# Patient Record
Sex: FEMALE | Race: WHITE | HISPANIC OR LATINO | Employment: FULL TIME | URBAN - METROPOLITAN AREA
[De-identification: names, ages, dates, MRNs, and addresses within clinical notes are randomized per-mention and may not be internally consistent; named-entity substitution may affect disease eponyms.]

---

## 2019-01-03 ENCOUNTER — OFFICE VISIT (OUTPATIENT)
Dept: OBGYN CLINIC | Facility: CLINIC | Age: 39
End: 2019-01-03
Payer: COMMERCIAL

## 2019-01-03 VITALS
DIASTOLIC BLOOD PRESSURE: 60 MMHG | SYSTOLIC BLOOD PRESSURE: 116 MMHG | HEIGHT: 64 IN | WEIGHT: 149.6 LBS | BODY MASS INDEX: 25.54 KG/M2

## 2019-01-03 DIAGNOSIS — Z87.42 HISTORY OF OVARIAN CYST: ICD-10-CM

## 2019-01-03 DIAGNOSIS — Z33.1 ENCOUNTER FOR INCIDENTAL PREGNANCY: ICD-10-CM

## 2019-01-03 DIAGNOSIS — Z86.2 HISTORY OF ANEMIA: ICD-10-CM

## 2019-01-03 DIAGNOSIS — N91.2 AMENORRHEA: ICD-10-CM

## 2019-01-03 DIAGNOSIS — Z98.891 HISTORY OF 2 CESAREAN SECTIONS: Primary | ICD-10-CM

## 2019-01-03 DIAGNOSIS — O09.521 AMA (ADVANCED MATERNAL AGE) MULTIGRAVIDA 35+, FIRST TRIMESTER: ICD-10-CM

## 2019-01-03 LAB — SL AMB POCT URINE HCG: POSITIVE

## 2019-01-03 PROCEDURE — 81025 URINE PREGNANCY TEST: CPT | Performed by: OBSTETRICS & GYNECOLOGY

## 2019-01-03 PROCEDURE — 99203 OFFICE O/P NEW LOW 30 MIN: CPT | Performed by: OBSTETRICS & GYNECOLOGY

## 2019-01-03 RX ORDER — PNV NO.95/FERROUS FUM/FOLIC AC 28MG-0.8MG
1 TABLET ORAL DAILY
Qty: 30 TABLET | Refills: 9 | Status: SHIPPED | OUTPATIENT
Start: 2019-01-03 | End: 2019-10-11 | Stop reason: SDUPTHER

## 2019-01-07 ENCOUNTER — APPOINTMENT (OUTPATIENT)
Dept: LAB | Facility: CLINIC | Age: 39
End: 2019-01-07
Payer: COMMERCIAL

## 2019-01-07 DIAGNOSIS — Z33.1 ENCOUNTER FOR INCIDENTAL PREGNANCY: ICD-10-CM

## 2019-01-07 LAB
ABO GROUP BLD: NORMAL
B-HCG SERPL-ACNC: ABNORMAL MIU/ML
BASOPHILS # BLD AUTO: 0.02 THOUSANDS/ΜL (ref 0–0.1)
BASOPHILS NFR BLD AUTO: 0 % (ref 0–1)
BLD GP AB SCN SERPL QL: NEGATIVE
EOSINOPHIL # BLD AUTO: 0.05 THOUSAND/ΜL (ref 0–0.61)
EOSINOPHIL NFR BLD AUTO: 1 % (ref 0–6)
ERYTHROCYTE [DISTWIDTH] IN BLOOD BY AUTOMATED COUNT: 15.1 % (ref 11.6–15.1)
EXTERNAL HIV-1 ANTIBODY: NEGATIVE
HCT VFR BLD AUTO: 36.3 % (ref 34.8–46.1)
HGB BLD-MCNC: 11.3 G/DL (ref 11.5–15.4)
IMM GRANULOCYTES # BLD AUTO: 0.01 THOUSAND/UL (ref 0–0.2)
IMM GRANULOCYTES NFR BLD AUTO: 0 % (ref 0–2)
LYMPHOCYTES # BLD AUTO: 1.41 THOUSANDS/ΜL (ref 0.6–4.47)
LYMPHOCYTES NFR BLD AUTO: 29 % (ref 14–44)
MCH RBC QN AUTO: 27.2 PG (ref 26.8–34.3)
MCHC RBC AUTO-ENTMCNC: 31.1 G/DL (ref 31.4–37.4)
MCV RBC AUTO: 87 FL (ref 82–98)
MONOCYTES # BLD AUTO: 0.54 THOUSAND/ΜL (ref 0.17–1.22)
MONOCYTES NFR BLD AUTO: 11 % (ref 4–12)
NEUTROPHILS # BLD AUTO: 2.8 THOUSANDS/ΜL (ref 1.85–7.62)
NEUTS SEG NFR BLD AUTO: 59 % (ref 43–75)
NRBC BLD AUTO-RTO: 0 /100 WBCS
PLATELET # BLD AUTO: 345 THOUSANDS/UL (ref 149–390)
PMV BLD AUTO: 9.9 FL (ref 8.9–12.7)
PROGEST SERPL-MCNC: 15.9 NG/ML
RBC # BLD AUTO: 4.16 MILLION/UL (ref 3.81–5.12)
RH BLD: POSITIVE
SPECIMEN EXPIRATION DATE: NORMAL
TSH SERPL DL<=0.05 MIU/L-ACNC: 2.39 UIU/ML (ref 0.36–3.74)
WBC # BLD AUTO: 4.83 THOUSAND/UL (ref 4.31–10.16)

## 2019-01-07 PROCEDURE — 84443 ASSAY THYROID STIM HORMONE: CPT

## 2019-01-07 PROCEDURE — 84702 CHORIONIC GONADOTROPIN TEST: CPT

## 2019-01-07 PROCEDURE — 84144 ASSAY OF PROGESTERONE: CPT

## 2019-01-07 PROCEDURE — 36415 COLL VENOUS BLD VENIPUNCTURE: CPT

## 2019-01-07 PROCEDURE — 80081 OBSTETRIC PANEL INC HIV TSTG: CPT

## 2019-01-08 LAB
HBV SURFACE AG SER QL: NORMAL
HIV 1+2 AB+HIV1 P24 AG SERPL QL IA: NORMAL
RPR SER QL: NORMAL
RUBV IGG SERPL IA-ACNC: 73.2 IU/ML

## 2019-01-29 ENCOUNTER — INITIAL PRENATAL (OUTPATIENT)
Dept: OBGYN CLINIC | Facility: CLINIC | Age: 39
End: 2019-01-29
Payer: COMMERCIAL

## 2019-01-29 ENCOUNTER — ULTRASOUND (OUTPATIENT)
Dept: OBGYN CLINIC | Facility: CLINIC | Age: 39
End: 2019-01-29
Payer: COMMERCIAL

## 2019-01-29 DIAGNOSIS — Z36.9 ANTENATAL SCREENING ENCOUNTER: ICD-10-CM

## 2019-01-29 DIAGNOSIS — Z34.81 PRENATAL CARE, SUBSEQUENT PREGNANCY, FIRST TRIMESTER: Primary | ICD-10-CM

## 2019-01-29 PROCEDURE — 76817 TRANSVAGINAL US OBSTETRIC: CPT | Performed by: OBSTETRICS & GYNECOLOGY

## 2019-01-29 PROCEDURE — OBC

## 2019-01-29 NOTE — PROGRESS NOTES
Prenatal ultrasound was performed  Patient was given [facility] packet  She is declining genetic counseling, however is requesting 1st trimester screening  MFM did not have an appointment available in her time window, so they will have their  call patient directly to set up

## 2019-01-29 NOTE — PROGRESS NOTES
OB Intake  o Patient presents for OB intake interview  o Accompanied by: Self  o FOB:    Involved:Unknown      Planned pregnancy: Unknown  o     o Hx of  delivery prior to 36 weeks 6 days:                             no  o LMP:   Patient's last menstrual period was 2018 (exact date)  o U/S date: 19   - 10 weeks  5 days  o Estimated Date of Delivery: None noted  - confirmed by LMP or U/S    o Signs and Symptoms of pregnancy:               positive home pregnancy test  - Constipation:    no  - Headaches:   no  - Cramping/spotting:    no  - PICA cravings:    no  - Diabetes: If you answer yes, please order 1 hr gtt testing, 50grams   History of gestational diabetes    no   BMI >35     no   Advance maternal age >35   yes   First degree relative with type 2 diabetes    no   History of PCOS   no   Current metformin use    no   Prior history of macrosomia or LGA    no  o Immunization Record  -   - There is no immunization history on file for this patient   o Tdap:  - Counseled to be given after 28 weeks  o Influenza vaccine discussed  o MRSA questionnaire:     negative  o Dental visit within last 6 months  - yes   - If no, recommendations discussed  Interview education:  Educational material provided on After Visit Summary (AVS)     Handouts given at todays visit  o [employer] phone application guide  o [employer] support center  o CDCs Response to [place]  o Pregnancy Warning Signs  o Medications Safe to Take During Pregnancy  o Hospital Visiting Guidelines  o Lab Locations  o [facility]  o Pediatric Practice Locations  o Birth Plan Form  o Labor Room rules and Regulations  o Birth Certificate Worksheets  o Authorization for Release for Photographers  o Perineal Massage  o [facility]  - Sequential screening reviewed  - Cystic fibrosis test reviewed  - Spinal Muscular Atrophy test reviewed    N[facility] Family [facility]:   No  ONAF form submitted:    No    MyChart activated (not 1518 years of age)  If no, explain: Information Given  - Code provided:   No    Interview done by: Jovani Nails RN 01/29/19    1  Prenatal care, subsequent pregnancy, first trimester  Patient reported a large painful varicose vein on left leg with significant edema with last pregnancy  Pt reports she was put on baby aspirin as a preventative measure   Cecilio high blood pressures at the time

## 2019-02-15 ENCOUNTER — ROUTINE PRENATAL (OUTPATIENT)
Dept: PERINATAL CARE | Facility: CLINIC | Age: 39
End: 2019-02-15
Payer: COMMERCIAL

## 2019-02-15 VITALS
BODY MASS INDEX: 26.29 KG/M2 | SYSTOLIC BLOOD PRESSURE: 106 MMHG | HEIGHT: 64 IN | WEIGHT: 154 LBS | HEART RATE: 82 BPM | DIASTOLIC BLOOD PRESSURE: 67 MMHG

## 2019-02-15 DIAGNOSIS — Z36.9 ANTENATAL SCREENING ENCOUNTER: ICD-10-CM

## 2019-02-15 DIAGNOSIS — O09.522 ELDERLY MULTIGRAVIDA, SECOND TRIMESTER: Primary | ICD-10-CM

## 2019-02-15 DIAGNOSIS — Z3A.12 12 WEEKS GESTATION OF PREGNANCY: ICD-10-CM

## 2019-02-15 PROCEDURE — 76801 OB US < 14 WKS SINGLE FETUS: CPT | Performed by: OBSTETRICS & GYNECOLOGY

## 2019-02-15 PROCEDURE — 99241 PR OFFICE CONSULTATION NEW/ESTAB PATIENT 15 MIN: CPT | Performed by: OBSTETRICS & GYNECOLOGY

## 2019-02-15 PROCEDURE — 76813 OB US NUCHAL MEAS 1 GEST: CPT | Performed by: OBSTETRICS & GYNECOLOGY

## 2019-02-15 NOTE — PROGRESS NOTES
Please refer to the [facility] ultrasound report in Ob Procedures for additional information regarding the visit to the [facility], [facility]  today

## 2019-02-15 NOTE — LETTER
February 15, 2019     Aayush Astorga MD  [facility]    Patient: Rehan Stark   YOB: 1980   Date of Visit: 2/15/2019       Dear Dr Ivette Ramirez: Thank you for referring Rehan Stark to me for evaluation  Below are my notes for this consultation  If you have questions, please do not hesitate to call me  I look forward to following your patient along with you  Sincerely,        Mic Schulz MD        CC: No Recipients  Mic Schulz MD  2/15/2019  4:34 PM  Sign at close encounter  Please refer to the [facility] ultrasound report in Ob Procedures for additional information regarding the visit to the [facility] the [facility]  today

## 2019-02-27 ENCOUNTER — INITIAL PRENATAL (OUTPATIENT)
Dept: OBGYN CLINIC | Facility: CLINIC | Age: 39
End: 2019-02-27

## 2019-02-27 ENCOUNTER — TELEPHONE (OUTPATIENT)
Dept: PERINATAL CARE | Facility: CLINIC | Age: 39
End: 2019-02-27

## 2019-02-27 VITALS
BODY MASS INDEX: 26.6 KG/M2 | HEIGHT: 64 IN | HEART RATE: 83 BPM | DIASTOLIC BLOOD PRESSURE: 68 MMHG | WEIGHT: 155.8 LBS | SYSTOLIC BLOOD PRESSURE: 112 MMHG

## 2019-02-27 DIAGNOSIS — O09.522 ADVANCED MATERNAL AGE IN MULTIGRAVIDA, SECOND TRIMESTER: ICD-10-CM

## 2019-02-27 DIAGNOSIS — Z11.51 SCREENING FOR HPV (HUMAN PAPILLOMAVIRUS): ICD-10-CM

## 2019-02-27 DIAGNOSIS — Z98.891 HISTORY OF 2 CESAREAN SECTIONS: ICD-10-CM

## 2019-02-27 DIAGNOSIS — Z11.3 SCREENING FOR STDS (SEXUALLY TRANSMITTED DISEASES): Primary | ICD-10-CM

## 2019-02-27 DIAGNOSIS — Z98.890 HISTORY OF AUGMENTATION OF BOTH BREASTS: ICD-10-CM

## 2019-02-27 DIAGNOSIS — O22.02 OBSTETRIC VARICOSE VEINS IN SECOND TRIMESTER: ICD-10-CM

## 2019-02-27 DIAGNOSIS — Z12.4 SCREENING FOR CERVICAL CANCER: ICD-10-CM

## 2019-02-27 DIAGNOSIS — Z3A.14 14 WEEKS GESTATION OF PREGNANCY: ICD-10-CM

## 2019-02-27 PROCEDURE — G0145 SCR C/V CYTO,THINLAYER,RESCR: HCPCS | Performed by: OBSTETRICS & GYNECOLOGY

## 2019-02-27 PROCEDURE — 87591 N.GONORRHOEAE DNA AMP PROB: CPT | Performed by: OBSTETRICS & GYNECOLOGY

## 2019-02-27 PROCEDURE — 87624 HPV HI-RISK TYP POOLED RSLT: CPT | Performed by: OBSTETRICS & GYNECOLOGY

## 2019-02-27 PROCEDURE — 87491 CHLMYD TRACH DNA AMP PROBE: CPT | Performed by: OBSTETRICS & GYNECOLOGY

## 2019-02-27 PROCEDURE — PNV: Performed by: OBSTETRICS & GYNECOLOGY

## 2019-02-27 NOTE — PROGRESS NOTES
IUP at 14 weeks and 6 days, history of  section x2  Patient recently completed the 1st part sequential screen testing 2nd part is pending  Patient complains of all varicose veins in her left lower leg  Recommend support hose while at work and elevate her legs at the end of the day  Patient request note to decrease work are some 10 hours a day to 8 hours only  All questions answered  Reviewed signs of  labor and kick counts follow-up in 4 weeks and p r n

## 2019-02-27 NOTE — LETTER
Name: Abhijeet Bethea  : 1980  MRN: [id number]    To:   Centralized Faxing Team [phone]      The attached documents are complete  Please scan and add into the patient's chart  No other action is needed at this time  Please call us with any questions at [phone]      Rozina Ahn RN

## 2019-02-27 NOTE — LETTER
February 27, 2019     Patient: Miesha Hoang   YOB: 1980   Date of Visit: 2/27/2019       To Whom it May Concern:    Miesha Hoang is under my professional care  She was seen in my office on 2/27/2019  Please allow Edison to work only 8 hour days    If you have any questions or concerns, please don't hesitate to call  Sincerely,          JOSHUA Urias DO        CC: No Recipients

## 2019-02-27 NOTE — LETTER
02/27/19  Aung Calix  1980    Thank you for completing the cell-free DNA screen ("non-invasive prenatal screening" or "[id number]")  To obtain comprehensive screening results, please complete blood work for MSAFP (maternal-serum alpha fetoprotein) between the weeks of ______ to ______  Based on your insurance coverage, please use one of the following locations  Call our office for any questions at [phone]      [facility] [address]   [facility], [facility], [address] [facility]   Phone: [address]  [address] [facility], [address] [place]    Phone: [address], [address] [place] [address]  Phone: [phone] [address] [facility], [address]   Phone: [phone] Tiffany Bowen for lab)    [address] [facility]  [facility] [address], [address] [facility]; [place], [address] [place] [address]   Phone: [phone]    [address] [facility]  [address] [facility], [address], [place] [address]   Phone: [phone]    Sincerely,    Venice Bryan RN

## 2019-02-27 NOTE — TELEPHONE ENCOUNTER
Edison notified [id number] results are WNL along with gender of baby at her request  Instructions given and TRF mailed for part 2 blood draw

## 2019-02-28 LAB
HPV HR 12 DNA CVX QL NAA+PROBE: NEGATIVE
HPV16 DNA CVX QL NAA+PROBE: NEGATIVE
HPV18 DNA CVX QL NAA+PROBE: NEGATIVE

## 2019-03-01 LAB
C TRACH DNA SPEC QL NAA+PROBE: NEGATIVE
N GONORRHOEA DNA SPEC QL NAA+PROBE: NEGATIVE

## 2019-03-03 LAB
LAB AP GYN PRIMARY INTERPRETATION: NORMAL
Lab: NORMAL

## 2019-03-05 ENCOUNTER — ROUTINE PRENATAL (OUTPATIENT)
Dept: OBGYN CLINIC | Facility: CLINIC | Age: 39
End: 2019-03-05
Payer: COMMERCIAL

## 2019-03-05 VITALS
WEIGHT: 156 LBS | DIASTOLIC BLOOD PRESSURE: 69 MMHG | HEIGHT: 64 IN | HEART RATE: 87 BPM | SYSTOLIC BLOOD PRESSURE: 112 MMHG | BODY MASS INDEX: 26.63 KG/M2

## 2019-03-05 DIAGNOSIS — Z3A.15 15 WEEKS GESTATION OF PREGNANCY: Primary | ICD-10-CM

## 2019-03-05 DIAGNOSIS — O26.899 PELVIC PAIN IN PREGNANCY: ICD-10-CM

## 2019-03-05 DIAGNOSIS — R10.2 PELVIC PAIN IN PREGNANCY: ICD-10-CM

## 2019-03-05 PROCEDURE — 99214 OFFICE O/P EST MOD 30 MIN: CPT | Performed by: OBSTETRICS & GYNECOLOGY

## 2019-03-05 NOTE — PROGRESS NOTES
Patient is here for problem visit  Patient is complaining of low back pain and right lower quadrant pain that started yesterday  She states the pain is about a 7/10  She denies any bleeding or leakage of fluid or vaginal discharge  She did start to notice the pain worse with movement for example getting up out of bed  Patient denies a history of kidney stones  She has no CVA tenderness  There is no distinct abdominal tenderness or rebound or guarding  Cervical length was 32 mm with no funneling  Discussed with patient most likely round ligament pain  She was advised to rest, take Tylenol as needed for pain and to use mild heat as needed  She was given a note for work for today and tomorrow and she may return back to work on Thursday

## 2019-03-05 NOTE — LETTER
March 5, 2019     Patient: Deo Summers   YOB: 1980   Date of Visit: 3/5/2019       To Whom it May Concern:    Deo Summers is under my professional care  She was seen in my office on 3/5/2019  She may return to work on 3/7/19  Please excuse the patient from work from March 5, 2019 to March 6, 2019  If you have any questions or concerns, please don't hesitate to call           Sincerely,          Uzma Quijano MD        CC: No Recipients

## 2019-03-05 NOTE — PATIENT INSTRUCTIONS
Round Ligament Pain   AMBULATORY CARE:   Round ligament pain  is caused when ligaments are stretched as your uterus (womb) gets bigger during pregnancy  Round ligaments are found on each side of your uterus  They are bands of tissue that hold the uterus in place  Round ligament pain happens most often during the second trimester  It is a normal part of pregnancy and should stop by the third trimester  The pain is not serious and will not hurt your baby  Common signs and symptoms of round ligament pain:   · Pain on one or both sides of your lower abdomen or groin that may move up to your hip    · Spasms in the muscles in your abdomen    · Pain that lasts a few seconds    · Pain that happens when you exercise, sneeze, change positions, or stand quickly  Contact your obstetrician if:   · You have pain that is spreading to other parts of your body  · You have new or worsening pain  · You have pain that lasts longer than a few minutes at a time  · You have questions or concerns about your condition or care  Manage your pain:  Round ligament pain does not need to be treated  The following may help make you more comfortable:  · Rest as often as you can  Rest can help relieve round ligament pain  You might want to lie on the side that has pain  Place a pillow under your abdomen  Keep another pillow between your knees  · Move slowly  Sudden movement can stretch the ligaments and cause pain  Stand, sit, and change positions slowly  Try to tighten the muscles in your hips before you sneeze or laugh  You can also sit down and bring your knees up toward your abdomen  This can help relieve tension on the ligaments  · Exercise as directed  Gentle exercise can keep the ligaments loose and strengthen core (abdominal) muscles  An example is swimming, or a yoga program designed for pregnancy  Ask your healthcare provider which exercises are safe for you and how often to exercise   For most healthy women, a good goal is to try to get at least 30 minutes of exercise every day  If activity causes pain, try not to walk too long or too far at one time  Break your exercise up into short amounts  · Apply a warm compress to the area  Warmth can relieve pain and muscle spasms  Ask your healthcare provider if you can take a warm bath or use a heating pad  Keep all heat settings low  High heat can be dangerous for your baby  Do not sit in a hot tub or use hot water in your bath  You may also be able to massage the area gently while you are applying heat  Massage can help relieve pain  · Ask about pain medicines  Ask your healthcare provider before you take any medicine during pregnancy, including over-the-counter pain medicines  Your healthcare provider may recommend acetaminophen to relieve the pain  Ask how much to take and how often to take it  Follow directions  Acetaminophen can cause liver damage  Too much medicine can be harmful to your baby  Follow up with your obstetrician as directed:  Write down your questions so you remember to ask them during your visits  © 2017 [address]  Information is for End User's use only and may not be sold, redistributed or otherwise used for commercial purposes  All illustrations and images included in CareNotes® are the copyrighted property of A D A M , Inc  or Harsh Gusman  The above information is an  only  It is not intended as medical advice for individual conditions or treatments  Talk to your doctor, nurse or pharmacist before following any medical regimen to see if it is safe and effective for you

## 2019-03-27 ENCOUNTER — TRANSCRIBE ORDERS (OUTPATIENT)
Dept: ADMINISTRATIVE | Facility: HOSPITAL | Age: 39
End: 2019-03-27

## 2019-03-27 ENCOUNTER — ROUTINE PRENATAL (OUTPATIENT)
Dept: OBGYN CLINIC | Facility: CLINIC | Age: 39
End: 2019-03-27

## 2019-03-27 ENCOUNTER — LAB (OUTPATIENT)
Dept: LAB | Facility: HOSPITAL | Age: 39
End: 2019-03-27
Attending: OBSTETRICS & GYNECOLOGY
Payer: COMMERCIAL

## 2019-03-27 VITALS
BODY MASS INDEX: 27.49 KG/M2 | HEIGHT: 64 IN | HEART RATE: 92 BPM | SYSTOLIC BLOOD PRESSURE: 112 MMHG | WEIGHT: 161 LBS | DIASTOLIC BLOOD PRESSURE: 70 MMHG

## 2019-03-27 DIAGNOSIS — Z36.9 RESEARCH REQUESTED ANTENATAL ULTRASOUND SCAN: ICD-10-CM

## 2019-03-27 DIAGNOSIS — O22.00 VARICOSE VEINS DURING PREGNANCY, ANTEPARTUM: ICD-10-CM

## 2019-03-27 DIAGNOSIS — O09.522 ELDERLY MULTIGRAVIDA, SECOND TRIMESTER: Primary | ICD-10-CM

## 2019-03-27 DIAGNOSIS — Z3A.18 18 WEEKS GESTATION OF PREGNANCY: ICD-10-CM

## 2019-03-27 DIAGNOSIS — Z33.1 PREGNANT STATE, INCIDENTAL: ICD-10-CM

## 2019-03-27 DIAGNOSIS — Z36.9 RESEARCH REQUESTED ANTENATAL ULTRASOUND SCAN: Primary | ICD-10-CM

## 2019-03-27 PROCEDURE — PNV: Performed by: OBSTETRICS & GYNECOLOGY

## 2019-03-27 PROCEDURE — 36415 COLL VENOUS BLD VENIPUNCTURE: CPT

## 2019-03-27 PROCEDURE — 82105 ALPHA-FETOPROTEIN SERUM: CPT

## 2019-03-27 NOTE — PROGRESS NOTES
Patient is here for of routine OB visit  Patient states that she has been feeling movement but not consistently recently  Fetal heart rate was noted to be 144 beats per minute  Limited trans abdominal ultrasound was performed and showed fetal movement  Anterior placenta  Fetus was in transverse presentation  Amniotic fluid appeared grossly normal   Discussed with patient quickening and fetal movement  Patient had a cell free DNA testing that was normal   She is scheduled for a level 2 ultrasound on April 10th  Patient is complaining of varicose veins on the left  Compression stockings were ordered  MSAFP to done   Labs ordered
N/A

## 2019-03-27 NOTE — PATIENT INSTRUCTIONS
Pregnancy at 15 to 18 Weeks   [address]:   What changes are happening in my body? Now that you are in your second trimester, you have more energy  You may also feel hungrier than usual  You may start to experience other symptoms, such as heartburn or dizziness  You may be gaining about ½ to 1 pound a week, and your pregnancy is beginning to show  You may need to start wearing maternity clothes  How do I care for myself at this stage of my pregnancy? · Manage heartburn  by eating 4 or 5 small meals each day instead of large meals  Avoid spicy foods  Avoid eating right before bedtime  · Manage nausea and vomiting  Avoid fatty and spicy foods  Eat small meals throughout the day instead of large meals  Tisha may help to decrease nausea  Ask your healthcare provider about other ways of decreasing nausea and vomiting  · Eat a variety of healthy foods  Healthy foods include fruits, vegetables, whole-grain breads, low-fat dairy foods, beans, lean meats, and fish  Drink liquids as directed  Ask how much liquid to drink each day and which liquids are best for you  Limit caffeine to less than 200 milligrams each day  Limit your intake of fish to 2 servings each week  Choose fish low in mercury such as canned light tuna, shrimp, salmon, cod, or tilapia  Do not  eat fish high in mercury such as swordfish, tilefish, luisito mackerel, and shark  · Take prenatal vitamins as directed  Your need for certain vitamins and minerals, such as folic acid, increases during pregnancy  Prenatal vitamins provide some of the extra vitamins and minerals you need  Prenatal vitamins may also help to decrease the risk of certain birth defects  · Do not smoke  If you smoke, it is never too late to quit  Smoking increases your risk of a miscarriage and other health problems during your pregnancy  Smoking can cause your baby to be born too early or weigh less at birth   Ask your healthcare provider for information if you need help quitting  · Do not drink alcohol  Alcohol passes from your body to your baby through the placenta  It can affect your baby's brain development and cause fetal alcohol syndrome (FAS)  FAS is a group of conditions that causes mental, behavior, and growth problems  · Talk to your healthcare provider before you take any medicines  Many medicines may harm your baby if you take them when you are pregnant  Do not take any medicines, vitamins, herbs, or supplements without first talking to your healthcare provider  Never use illegal or street drugs (such as marijuana or cocaine) while you are pregnant  What are some safety tips during pregnancy? · Avoid hot tubs and saunas  Do not use a hot tub or sauna while you are pregnant, especially during your first trimester  Hot tubs and saunas may raise your baby's temperature and increase the risk of birth defects  · Avoid toxoplasmosis  This is an infection caused by eating raw meat or being around infected cat feces  It can cause birth defects, miscarriages, and other problems  Wash your hands after you touch raw meat  Make sure any meat is well-cooked before you eat it  Avoid raw eggs and unpasteurized milk  Use gloves or ask someone else to clean your cat's litter box while you are pregnant  What changes are happening with my baby? By 18 weeks, your baby may be about 6 inches long from the top of the head to the rump (baby's bottom)  Your baby may weigh about 11 ounces  You may be able to feel your baby's movement at about 18 weeks or later  The first movements may not be that noticeable  They may feel like a fluttering sensation  Your baby also makes sucking movements and can hear certain sounds  What do I need to know about prenatal care? During the first 28 weeks of your pregnancy, you will see your healthcare provider once a month  Your healthcare provider will check your blood pressure and weight   You may also need any of the following:  · A urine test  may also be done to check for sugar and protein  These can be signs of gestational diabetes or infection  · A blood test  may be done to check for anemia (low iron level)  · Fundal height check  is a measurement of your uterus to check your baby's growth  This number is usually the same as the number of weeks that you have been pregnant  · An ultrasound  may be done to check your baby's development  Your healthcare provider may be able to tell you what your baby's gender is during the ultrasound  · Your baby's heart rate  will be checked  When should I seek immediate care? · You have pain or cramping in your abdomen or low back  · You have heavy vaginal bleeding or clotting  · You pass material that looks like tissue or large clots  Collect the material and bring it with you  When should I contact my healthcare provider? · You cannot keep food or drinks down, and you are losing weight  · You have light bleeding  · You have chills or a fever  · You have vaginal itching, burning, or pain  · You have yellow, green, white, or foul-smelling vaginal discharge  · You have pain or burning when you urinate, less urine than usual, or pink or bloody urine  · You have questions or concerns about your condition or care  CARE AGREEMENT:   You have the right to help plan your care  Learn about your health condition and how it may be treated  Discuss treatment options with your caregivers to decide what care you want to receive  You always have the right to refuse treatment  The above information is an  only  It is not intended as medical advice for individual conditions or treatments  Talk to your doctor, nurse or pharmacist before following any medical regimen to see if it is safe and effective for you    © 2017 [employer] Information is for End User's use only and may not be sold, redistributed or otherwise used for commercial purposes  All illustrations and images included in CareNotes® are the copyrighted property of A D A M , Inc  or Harsh Gusman

## 2019-03-30 LAB
2ND TRIMESTER 4 SCREEN SERPL-IMP: NORMAL
AFP ADJ MOM SERPL: 0.93
AFP INTERP AMN-IMP: NORMAL
AFP INTERP SERPL-IMP: NORMAL
AFP INTERP SERPL-IMP: NORMAL
AFP SERPL-MCNC: 41 NG/ML
AGE AT DELIVERY: 38.8 YR
GA METHOD: NORMAL
GA: 18.4 WEEKS
IDDM PATIENT QL: NO
MULTIPLE PREGNANCY: NO
NEURAL TUBE DEFECT RISK FETUS: NORMAL %

## 2019-04-02 ENCOUNTER — TELEPHONE (OUTPATIENT)
Dept: PERINATAL CARE | Facility: CLINIC | Age: 39
End: 2019-04-02

## 2019-04-10 ENCOUNTER — ROUTINE PRENATAL (OUTPATIENT)
Dept: PERINATAL CARE | Facility: CLINIC | Age: 39
End: 2019-04-10
Payer: COMMERCIAL

## 2019-04-10 VITALS
HEIGHT: 64 IN | WEIGHT: 168 LBS | SYSTOLIC BLOOD PRESSURE: 121 MMHG | DIASTOLIC BLOOD PRESSURE: 76 MMHG | BODY MASS INDEX: 28.68 KG/M2 | HEART RATE: 82 BPM

## 2019-04-10 DIAGNOSIS — O22.02: ICD-10-CM

## 2019-04-10 DIAGNOSIS — O09.522 ELDERLY MULTIGRAVIDA, SECOND TRIMESTER: Primary | ICD-10-CM

## 2019-04-10 DIAGNOSIS — Z36.86 ENCOUNTER FOR ANTENATAL SCREENING FOR CERVICAL LENGTH: ICD-10-CM

## 2019-04-10 DIAGNOSIS — Z3A.20 20 WEEKS GESTATION OF PREGNANCY: ICD-10-CM

## 2019-04-10 DIAGNOSIS — O34.219 PREVIOUS CESAREAN SECTION COMPLICATING PREGNANCY, ANTEPARTUM CONDITION OR COMPLICATION: ICD-10-CM

## 2019-04-10 PROCEDURE — 76817 TRANSVAGINAL US OBSTETRIC: CPT | Performed by: OBSTETRICS & GYNECOLOGY

## 2019-04-10 PROCEDURE — 99213 OFFICE O/P EST LOW 20 MIN: CPT | Performed by: OBSTETRICS & GYNECOLOGY

## 2019-04-10 PROCEDURE — 76811 OB US DETAILED SNGL FETUS: CPT | Performed by: OBSTETRICS & GYNECOLOGY

## 2019-04-10 RX ORDER — SENNA PLUS 8.6 MG/1
8.6 TABLET ORAL 2 TIMES DAILY PRN
COMMUNITY
Start: 2018-04-04 | End: 2019-07-15

## 2019-04-15 ENCOUNTER — TELEPHONE (OUTPATIENT)
Dept: OBGYN CLINIC | Facility: CLINIC | Age: 39
End: 2019-04-15

## 2019-04-15 DIAGNOSIS — I83.93 VARICOSE VEINS OF BOTH LOWER EXTREMITIES, UNSPECIFIED WHETHER COMPLICATED: Primary | ICD-10-CM

## 2019-04-17 ENCOUNTER — HOSPITAL ENCOUNTER (OUTPATIENT)
Dept: NON INVASIVE DIAGNOSTICS | Facility: HOSPITAL | Age: 39
Discharge: HOME | End: 2019-04-17
Payer: COMMERCIAL

## 2019-04-17 DIAGNOSIS — Z3A.20 20 WEEKS GESTATION OF PREGNANCY: ICD-10-CM

## 2019-04-17 DIAGNOSIS — O22.02: ICD-10-CM

## 2019-04-17 PROCEDURE — 93971 EXTREMITY STUDY: CPT

## 2019-04-17 PROCEDURE — 93971 EXTREMITY STUDY: CPT | Performed by: SURGERY

## 2019-04-24 ENCOUNTER — ROUTINE PRENATAL (OUTPATIENT)
Dept: OBGYN CLINIC | Facility: CLINIC | Age: 39
End: 2019-04-24

## 2019-04-24 VITALS
BODY MASS INDEX: 28.7 KG/M2 | DIASTOLIC BLOOD PRESSURE: 72 MMHG | SYSTOLIC BLOOD PRESSURE: 112 MMHG | HEART RATE: 83 BPM | WEIGHT: 167.2 LBS

## 2019-04-24 DIAGNOSIS — K30 ACID INDIGESTION: Primary | ICD-10-CM

## 2019-04-24 DIAGNOSIS — O22.02 OBSTETRIC VARICOSE VEINS IN SECOND TRIMESTER: ICD-10-CM

## 2019-04-24 DIAGNOSIS — Z98.891 HISTORY OF CESAREAN SECTION: ICD-10-CM

## 2019-04-24 DIAGNOSIS — Z3A.22 22 WEEKS GESTATION OF PREGNANCY: ICD-10-CM

## 2019-04-24 PROCEDURE — PNV: Performed by: OBSTETRICS & GYNECOLOGY

## 2019-04-24 RX ORDER — FAMOTIDINE 20 MG/1
20 TABLET, FILM COATED ORAL 2 TIMES DAILY
Qty: 30 TABLET | Refills: 3 | Status: SHIPPED | OUTPATIENT
Start: 2019-04-24

## 2019-04-29 ENCOUNTER — TELEPHONE (OUTPATIENT)
Dept: OBGYN CLINIC | Facility: CLINIC | Age: 39
End: 2019-04-29

## 2019-05-15 ENCOUNTER — APPOINTMENT (OUTPATIENT)
Dept: LAB | Facility: HOSPITAL | Age: 39
End: 2019-05-15
Attending: OBSTETRICS & GYNECOLOGY
Payer: COMMERCIAL

## 2019-05-15 DIAGNOSIS — I83.93 VARICOSE VEINS OF BOTH LOWER EXTREMITIES, UNSPECIFIED WHETHER COMPLICATED: ICD-10-CM

## 2019-05-15 DIAGNOSIS — Z3A.22 22 WEEKS GESTATION OF PREGNANCY: ICD-10-CM

## 2019-05-15 LAB
BASOPHILS # BLD AUTO: 0.02 THOUSANDS/ΜL (ref 0–0.1)
BASOPHILS NFR BLD AUTO: 0 % (ref 0–1)
EOSINOPHIL # BLD AUTO: 0.06 THOUSAND/ΜL (ref 0–0.61)
EOSINOPHIL NFR BLD AUTO: 1 % (ref 0–6)
ERYTHROCYTE [DISTWIDTH] IN BLOOD BY AUTOMATED COUNT: 14.6 % (ref 11.6–15.1)
GLUCOSE 1H P 50 G GLC PO SERPL-MCNC: 117 MG/DL
HCT VFR BLD AUTO: 40 % (ref 34.8–46.1)
HGB BLD-MCNC: 12.4 G/DL (ref 11.5–15.4)
IMM GRANULOCYTES # BLD AUTO: 0.08 THOUSAND/UL (ref 0–0.2)
IMM GRANULOCYTES NFR BLD AUTO: 1 % (ref 0–2)
LYMPHOCYTES # BLD AUTO: 1.36 THOUSANDS/ΜL (ref 0.6–4.47)
LYMPHOCYTES NFR BLD AUTO: 20 % (ref 14–44)
MCH RBC QN AUTO: 29.1 PG (ref 26.8–34.3)
MCHC RBC AUTO-ENTMCNC: 31 G/DL (ref 31.4–37.4)
MCV RBC AUTO: 94 FL (ref 82–98)
MONOCYTES # BLD AUTO: 0.53 THOUSAND/ΜL (ref 0.17–1.22)
MONOCYTES NFR BLD AUTO: 8 % (ref 4–12)
NEUTROPHILS # BLD AUTO: 4.79 THOUSANDS/ΜL (ref 1.85–7.62)
NEUTS SEG NFR BLD AUTO: 70 % (ref 43–75)
NRBC BLD AUTO-RTO: 0 /100 WBCS
PLATELET # BLD AUTO: 297 THOUSANDS/UL (ref 149–390)
PMV BLD AUTO: 9.6 FL (ref 8.9–12.7)
RBC # BLD AUTO: 4.26 MILLION/UL (ref 3.81–5.12)
RPR SER QL: NORMAL
WBC # BLD AUTO: 6.84 THOUSAND/UL (ref 4.31–10.16)

## 2019-05-15 PROCEDURE — 85025 COMPLETE CBC W/AUTO DIFF WBC: CPT

## 2019-05-15 PROCEDURE — 86592 SYPHILIS TEST NON-TREP QUAL: CPT

## 2019-05-15 PROCEDURE — 36415 COLL VENOUS BLD VENIPUNCTURE: CPT

## 2019-05-15 PROCEDURE — 82950 GLUCOSE TEST: CPT

## 2019-05-23 ENCOUNTER — ROUTINE PRENATAL (OUTPATIENT)
Dept: OBGYN CLINIC | Facility: CLINIC | Age: 39
End: 2019-05-23

## 2019-05-23 VITALS
WEIGHT: 178 LBS | SYSTOLIC BLOOD PRESSURE: 112 MMHG | DIASTOLIC BLOOD PRESSURE: 70 MMHG | BODY MASS INDEX: 30.39 KG/M2 | HEIGHT: 64 IN

## 2019-05-23 DIAGNOSIS — Z30.2 REQUEST FOR STERILIZATION: ICD-10-CM

## 2019-05-23 DIAGNOSIS — Z3A.27 27 WEEKS GESTATION OF PREGNANCY: Primary | ICD-10-CM

## 2019-05-23 DIAGNOSIS — Z98.891 HISTORY OF 2 CESAREAN SECTIONS: ICD-10-CM

## 2019-05-23 DIAGNOSIS — O09.522 MULTIGRAVIDA OF ADVANCED MATERNAL AGE IN SECOND TRIMESTER: ICD-10-CM

## 2019-05-23 PROCEDURE — PNV: Performed by: OBSTETRICS & GYNECOLOGY

## 2019-06-06 ENCOUNTER — ULTRASOUND (OUTPATIENT)
Dept: PERINATAL CARE | Facility: OTHER | Age: 39
End: 2019-06-06
Payer: COMMERCIAL

## 2019-06-06 VITALS
HEIGHT: 64 IN | DIASTOLIC BLOOD PRESSURE: 66 MMHG | WEIGHT: 183 LBS | SYSTOLIC BLOOD PRESSURE: 101 MMHG | HEART RATE: 67 BPM | BODY MASS INDEX: 31.24 KG/M2

## 2019-06-06 DIAGNOSIS — O34.219 PREVIOUS CESAREAN SECTION COMPLICATING PREGNANCY, ANTEPARTUM CONDITION OR COMPLICATION: ICD-10-CM

## 2019-06-06 DIAGNOSIS — Z3A.29 29 WEEKS GESTATION OF PREGNANCY: ICD-10-CM

## 2019-06-06 DIAGNOSIS — O09.523 ELDERLY MULTIGRAVIDA, THIRD TRIMESTER: ICD-10-CM

## 2019-06-06 PROCEDURE — 99212 OFFICE O/P EST SF 10 MIN: CPT | Performed by: OBSTETRICS & GYNECOLOGY

## 2019-06-06 PROCEDURE — 76816 OB US FOLLOW-UP PER FETUS: CPT | Performed by: OBSTETRICS & GYNECOLOGY

## 2019-06-10 ENCOUNTER — ROUTINE PRENATAL (OUTPATIENT)
Dept: OBGYN CLINIC | Facility: CLINIC | Age: 39
End: 2019-06-10
Payer: COMMERCIAL

## 2019-06-10 VITALS
BODY MASS INDEX: 31.07 KG/M2 | HEART RATE: 76 BPM | SYSTOLIC BLOOD PRESSURE: 104 MMHG | DIASTOLIC BLOOD PRESSURE: 62 MMHG | WEIGHT: 181 LBS

## 2019-06-10 DIAGNOSIS — O26.893 DYSURIA DURING PREGNANCY IN THIRD TRIMESTER: ICD-10-CM

## 2019-06-10 DIAGNOSIS — Z3A.29 29 WEEKS GESTATION OF PREGNANCY: ICD-10-CM

## 2019-06-10 DIAGNOSIS — O09.523 AMA (ADVANCED MATERNAL AGE) MULTIGRAVIDA 35+, THIRD TRIMESTER: ICD-10-CM

## 2019-06-10 DIAGNOSIS — M54.9 BACK PAIN AFFECTING PREGNANCY IN THIRD TRIMESTER: ICD-10-CM

## 2019-06-10 DIAGNOSIS — R30.0 BURNING WITH URINATION: Primary | ICD-10-CM

## 2019-06-10 DIAGNOSIS — N89.8 VAGINAL ITCHING: ICD-10-CM

## 2019-06-10 DIAGNOSIS — N89.8 VAGINAL DISCHARGE: ICD-10-CM

## 2019-06-10 DIAGNOSIS — Z98.891 HISTORY OF C-SECTION: ICD-10-CM

## 2019-06-10 DIAGNOSIS — N76.0 BV (BACTERIAL VAGINOSIS): ICD-10-CM

## 2019-06-10 DIAGNOSIS — O99.891 BACK PAIN AFFECTING PREGNANCY IN THIRD TRIMESTER: ICD-10-CM

## 2019-06-10 DIAGNOSIS — R30.0 DYSURIA DURING PREGNANCY IN THIRD TRIMESTER: ICD-10-CM

## 2019-06-10 DIAGNOSIS — B96.89 BV (BACTERIAL VAGINOSIS): ICD-10-CM

## 2019-06-10 LAB
SL AMB  POCT GLUCOSE, UA: NORMAL
SL AMB LEUKOCYTE ESTERASE,UA: NORMAL
SL AMB POCT BILIRUBIN,UA: NORMAL
SL AMB POCT BLOOD,UA: NORMAL
SL AMB POCT CLARITY,UA: CLEAR
SL AMB POCT COLOR,UA: YELLOW
SL AMB POCT KETONES,UA: NORMAL
SL AMB POCT NITRITE,UA: NORMAL
SL AMB POCT PH,UA: 7.5
SL AMB POCT SPECIFIC GRAVITY,UA: 1
SL AMB POCT URINE PROTEIN: NORMAL
SL AMB POCT UROBILINOGEN: 0.2

## 2019-06-10 PROCEDURE — 87210 SMEAR WET MOUNT SALINE/INK: CPT | Performed by: OBSTETRICS & GYNECOLOGY

## 2019-06-10 PROCEDURE — 81002 URINALYSIS NONAUTO W/O SCOPE: CPT | Performed by: OBSTETRICS & GYNECOLOGY

## 2019-06-10 PROCEDURE — PNV: Performed by: OBSTETRICS & GYNECOLOGY

## 2019-06-10 RX ORDER — CLOTRIMAZOLE AND BETAMETHASONE DIPROPIONATE 10; .64 MG/G; MG/G
CREAM TOPICAL 2 TIMES DAILY
Qty: 30 G | Refills: 0 | Status: SHIPPED | OUTPATIENT
Start: 2019-06-10 | End: 2019-07-15

## 2019-06-10 RX ORDER — METRONIDAZOLE 500 MG/1
500 TABLET ORAL EVERY 12 HOURS SCHEDULED
Qty: 14 TABLET | Refills: 0 | Status: SHIPPED | OUTPATIENT
Start: 2019-06-10 | End: 2019-06-17

## 2019-06-12 LAB
APPEARANCE UR: CLEAR
BACTERIA UR CULT: NORMAL
BACTERIA URNS QL MICRO: NORMAL
BILIRUB UR QL STRIP: NEGATIVE
COLOR UR: YELLOW
EPI CELLS #/AREA URNS HPF: NORMAL /HPF (ref 0–10)
GLUCOSE UR QL: NEGATIVE
HGB UR QL STRIP: NEGATIVE
KETONES UR QL STRIP: NEGATIVE
LEUKOCYTE ESTERASE UR QL STRIP: NEGATIVE
Lab: NO GROWTH
MICRO URNS: NORMAL
MICRO URNS: NORMAL
MUCOUS THREADS URNS QL MICRO: PRESENT
NITRITE UR QL STRIP: NEGATIVE
PH UR STRIP: 7.5 [PH] (ref 5–7.5)
PROT UR QL STRIP: NEGATIVE
RBC #/AREA URNS HPF: NORMAL /HPF (ref 0–2)
SP GR UR: 1.01 (ref 1–1.03)
UROBILINOGEN UR STRIP-ACNC: 0.2 EU/DL (ref 0.2–1)
WBC #/AREA URNS HPF: NORMAL /HPF (ref 0–5)

## 2019-06-24 ENCOUNTER — ROUTINE PRENATAL (OUTPATIENT)
Dept: OBGYN CLINIC | Facility: CLINIC | Age: 39
End: 2019-06-24

## 2019-06-24 VITALS
BODY MASS INDEX: 31.27 KG/M2 | DIASTOLIC BLOOD PRESSURE: 80 MMHG | HEART RATE: 93 BPM | SYSTOLIC BLOOD PRESSURE: 112 MMHG | WEIGHT: 182.2 LBS

## 2019-06-24 DIAGNOSIS — Z98.891 HISTORY OF 2 CESAREAN SECTIONS: ICD-10-CM

## 2019-06-24 DIAGNOSIS — O43.193 MARGINAL INSERTION OF UMBILICAL CORD AFFECTING MANAGEMENT OF MOTHER IN THIRD TRIMESTER: ICD-10-CM

## 2019-06-24 DIAGNOSIS — O09.523 ADVANCED MATERNAL AGE IN MULTIGRAVIDA, THIRD TRIMESTER: ICD-10-CM

## 2019-06-24 DIAGNOSIS — O09.93 HIGH-RISK PREGNANCY IN THIRD TRIMESTER: Primary | ICD-10-CM

## 2019-06-24 PROCEDURE — PNV: Performed by: OBSTETRICS & GYNECOLOGY

## 2019-07-03 PROBLEM — Z3A.29 29 WEEKS GESTATION OF PREGNANCY: Status: RESOLVED | Noted: 2019-03-27 | Resolved: 2019-07-03

## 2019-07-08 ENCOUNTER — ROUTINE PRENATAL (OUTPATIENT)
Dept: OBGYN CLINIC | Facility: CLINIC | Age: 39
End: 2019-07-08

## 2019-07-08 VITALS — WEIGHT: 184.4 LBS | DIASTOLIC BLOOD PRESSURE: 80 MMHG | BODY MASS INDEX: 31.65 KG/M2 | SYSTOLIC BLOOD PRESSURE: 120 MMHG

## 2019-07-08 DIAGNOSIS — O09.523 MULTIGRAVIDA OF ADVANCED MATERNAL AGE IN THIRD TRIMESTER: ICD-10-CM

## 2019-07-08 DIAGNOSIS — Z98.891 HISTORY OF 2 CESAREAN SECTIONS: ICD-10-CM

## 2019-07-08 DIAGNOSIS — Z3A.34 34 WEEKS GESTATION OF PREGNANCY: Primary | ICD-10-CM

## 2019-07-08 PROCEDURE — PNV: Performed by: OBSTETRICS & GYNECOLOGY

## 2019-07-08 NOTE — PROGRESS NOTES
IUP at 33 weeks and 4 days  Patient reports positive fetal movement, denies contractions leakage of fluid or bleeding  She does have a history of 2  sections  Desires elective repeat with bilateral tubal ligation  Reviewed signs of  labor and kick counts follow-up in 2 weeks and cirilo Malin 28 week labs are all normal follow-up in 2 weeks and cirilo boone

## 2019-07-15 ENCOUNTER — ULTRASOUND (OUTPATIENT)
Dept: PERINATAL CARE | Facility: OTHER | Age: 39
End: 2019-07-15
Payer: COMMERCIAL

## 2019-07-15 ENCOUNTER — OFFICE VISIT (OUTPATIENT)
Dept: URGENT CARE | Age: 39
End: 2019-07-15
Payer: COMMERCIAL

## 2019-07-15 ENCOUNTER — HOSPITAL ENCOUNTER (OUTPATIENT)
Facility: HOSPITAL | Age: 39
Discharge: HOME | End: 2019-07-15
Attending: OBSTETRICS & GYNECOLOGY | Admitting: OBSTETRICS & GYNECOLOGY
Payer: COMMERCIAL

## 2019-07-15 ENCOUNTER — TELEPHONE (OUTPATIENT)
Dept: OBGYN CLINIC | Facility: CLINIC | Age: 39
End: 2019-07-15

## 2019-07-15 VITALS
WEIGHT: 186 LBS | HEART RATE: 85 BPM | OXYGEN SATURATION: 99 % | TEMPERATURE: 96.9 F | BODY MASS INDEX: 31.76 KG/M2 | RESPIRATION RATE: 18 BRPM | DIASTOLIC BLOOD PRESSURE: 60 MMHG | HEIGHT: 64 IN | SYSTOLIC BLOOD PRESSURE: 122 MMHG

## 2019-07-15 VITALS
HEART RATE: 80 BPM | DIASTOLIC BLOOD PRESSURE: 66 MMHG | HEIGHT: 64 IN | WEIGHT: 187.2 LBS | BODY MASS INDEX: 31.96 KG/M2 | SYSTOLIC BLOOD PRESSURE: 102 MMHG

## 2019-07-15 VITALS
WEIGHT: 186 LBS | SYSTOLIC BLOOD PRESSURE: 118 MMHG | BODY MASS INDEX: 31.76 KG/M2 | TEMPERATURE: 98.1 F | HEART RATE: 71 BPM | DIASTOLIC BLOOD PRESSURE: 63 MMHG | HEIGHT: 64 IN

## 2019-07-15 DIAGNOSIS — Z3A.34 34 WEEKS GESTATION OF PREGNANCY: Primary | ICD-10-CM

## 2019-07-15 DIAGNOSIS — Z3A.34 34 WEEKS GESTATION OF PREGNANCY: ICD-10-CM

## 2019-07-15 DIAGNOSIS — O09.523 ELDERLY MULTIGRAVIDA, THIRD TRIMESTER: Primary | ICD-10-CM

## 2019-07-15 DIAGNOSIS — O34.219 PREVIOUS CESAREAN SECTION COMPLICATING PREGNANCY, ANTEPARTUM CONDITION OR COMPLICATION: ICD-10-CM

## 2019-07-15 PROCEDURE — NC001 PR NO CHARGE: Performed by: OBSTETRICS & GYNECOLOGY

## 2019-07-15 PROCEDURE — G0463 HOSPITAL OUTPT CLINIC VISIT: HCPCS

## 2019-07-15 PROCEDURE — 76816 OB US FOLLOW-UP PER FETUS: CPT | Performed by: OBSTETRICS & GYNECOLOGY

## 2019-07-15 PROCEDURE — 76815 OB US LIMITED FETUS(S): CPT | Performed by: OBSTETRICS & GYNECOLOGY

## 2019-07-15 PROCEDURE — 99212 OFFICE O/P EST SF 10 MIN: CPT

## 2019-07-15 PROCEDURE — 99213 OFFICE O/P EST LOW 20 MIN: CPT | Performed by: NURSE PRACTITIONER

## 2019-07-15 NOTE — PROGRESS NOTES
NAME: Lobo Webb is a 45 y o  female  : 1980    MRN: [id number]      Assessment and Plan   34 weeks gestation of pregnancy [Z3A 34]  1  34 weeks gestation of pregnancy  Transfer to other facility       Waymon Krabbe was seen today for abdominal pain  Diagnoses and all orders for this visit:    34 weeks gestation of pregnancy  -     Transfer to other facility     Patient today taken to [facility] via ambulance  Patient Instructions   Patient Instructions   On arrival patient was quickly assessed to room to of our office and showed active signs of distress  Patient has been having right lower quadrant pain and 10 sharpness every 5-6 minutes with some relief and then returns  Patient is 34 weeks and 4 days pregnant with her 3rd child  911 was called immediately to have patient evaluated at the [facility] in [facility]  Spoke with Dr Mohini Fischer via phone about patient and status and aware the patient is coming to the [facility] floor of the hospital   I then called the charge nurse of this [facility] OB floor and made them aware patient coming the hospital   Patient was stable upon leaving our office with EMS  Patient's family left via car to meet her at the hospital   5028 pt left by EMS to the hospital for evaluation,     Proceed to ER if symptoms worsen  Chief Complaint     Chief Complaint   Patient presents with    Abdominal Pain     Pt c/o right sided abdominal pain since last night (around 12am)  Pt states she urinated about 2hrs ago and had an abnormal amount of fluid come out  After this incident, patient states pain intensified and is intermittent q5-6min  Pt had US omkar today 11am  JANA 19  History of Present Illness     Pt here today having abdominal discomfort and pain and is 34 weeks pregnant and is her third pregnancy  The pain started two hrs ago while getting pictures taken at [facility]    She has been having constant pain to the lower right aspect of the abdomen and radiating down into her groin  At 1st she states she did feel like they were contractions but the becoming more intensely and frequently in the past 2 hours  Since her arrival to our office she has had strong sharp pain on the right side 3 times and approximately every 5-6 minutes  She states that she had an ultrasound done this afternoon and had no issues or complications at that time  She denies having any back pain chest discomfort shortness of breath however patient states that she had excessive amount of fluid coming from the vagina and not sure if something is abnormal   Denies any blood  Fetal heart tones were not obtained in the office today due to no instruments in the facility  Unable to do pelvic exam or accurately monitor fetal activity due to no instruments or equipment accessible in the facility and was sent to the ER for further evaluation via EMS  Review of Systems   Review of Systems   Gastrointestinal: Positive for abdominal pain  Current Medications     No current outpatient medications on file  Current Allergies     Allergies as of 07/15/2019    (No Known Allergies)              Past Medical History:   Diagnosis Date    Miscarriage     2    Varicella     Had as a child       Past Surgical History:   Procedure Laterality Date     SECTION      COSMETIC SURGERY         Family History   Problem Relation Age of Onset    Hypertension Mother     Hypertension Father     Diabetes Father     Diabetes Maternal Grandmother     Diabetes Maternal Grandfather     Prostate cancer Maternal Grandfather     Diabetes Paternal Grandmother     Diabetes Paternal Grandfather          Medications have been verified      The following portions of the patient's history were reviewed and updated as appropriate: allergies, current medications, past family history, past medical history, past social history, past surgical history and problem list     Objective   /60   Pulse 85   Temp (!) 96 9 °F (36 1 °C)   Resp 18   Ht 5' 4" (1 626 m)   Wt 84 4 kg (186 lb)   LMP 11/18/2018 (Exact Date)   SpO2 99%   BMI 31 93 kg/m²      Physical Exam     Physical Exam   Constitutional: She appears well-developed and well-nourished  No distress  Cardiovascular: Normal rate and regular rhythm  Pulmonary/Chest: Effort normal    Abdominal: There is tenderness in the right upper quadrant and right lower quadrant  34 weeks pregnant and 4 days today, baby is moving, sharp pain on the right side radiating down the right side of abdomen to the right groin, had some excessive fluid noted when going to the bathroom, no blood noted            SOLO Caballero

## 2019-07-15 NOTE — LETTER
July 15, 2019     Tiffany Figueroa MD  [address] [facility]    Patient: Helen Bai   YOB: 1980   Date of Visit: 7/15/2019       Dear Dr Belkys Subramanian:    Thank you for referring Helen Bai to me for evaluation  Below are my notes for this consultation  If you have questions, please do not hesitate to call me  I look forward to following your patient along with you  Sincerely,        Otis Delvalle MD        CC: No Recipients  Otis Delvalle MD  7/15/2019 12:05 PM  Sign at close encounter  Thank you for allowing me to participate in the care of your patient  Bel Cuellar was seen today for  fetal growth secondary to marginal placental cord insertion  Normal interval fetal growth and fluid are seen  Follow up recommended:  No further follow-up ultrasounds are recommended at this time unless other indications arise      Otis Delvalle MD

## 2019-07-15 NOTE — PATIENT INSTRUCTIONS
On arrival patient was quickly assessed to room to of our office and showed active signs of distress  Patient has been having right lower quadrant pain and 10 sharpness every 5-6 minutes with some relief and then returns  Patient is 34 weeks and 4 days pregnant with her 3rd child  911 was called immediately to have patient evaluated at the [facility] in [facility]  Spoke with Dr Aman Dinh via phone about patient and status and aware the patient is coming to the [facility] floor of the hospital   I then called the charge nurse of this [facility] OB floor and made them aware patient coming the hospital   Patient was stable upon leaving our office with EMS    Patient's family left via car to meet her at the hospital   [id number] pt left by EMS to the hospital for evaluation,

## 2019-07-16 NOTE — PROGRESS NOTES
L&D Triage Note - OB/GYN  Fco Cadet 45 y o  female MRN: [id number]  Unit/Bed#: L&D 323-01 Encounter: [id number]      Assessment:  45 y o  [id number] at 34w4d Not in  labor    Plan:  1   labor rule out  - speculum exam; cervix closed no fluid leakage with Valsalva maneuver KOH :  Negative  wetprep negative, Dry prep negative for ferning, nitrazine test negative  - SVE 0 5/50/-3  - labor precautions discussed, increase the following symptoms patient need to visit OB triage or call her doctor; vaginal bleeding, fluid leakage, decreased fetal movement and regular contractions    2  IUP at 34w4d  - patient has had 2  section earlier  - recommend routine prenatal visits      ______________________________________________________________________      Chief Compliant:  Lower abdominal pain    TIME:   Subjective:  45 y o  [id number] at 34w4d patient is complaining of lower abdominal pain and couple of contractions since afternoon  She decline fluid leakage, vaginal bleeding and reporting good fetal movements  She decline urinary urgency, increased frequency and blood in urine    She decline constipation or diarrhea      Objective:  Vitals:    07/15/19 2016   BP:    Pulse:    Temp: 98 1 °F (36 7 °C)       SVE: 0 5 / 50% / -3  FHT:  135 / Moderate 6 - 25 bpm / 15x15 accelerations and no decelerations, reactive  [place]: q infrequent contractions  Discussed with Dr Elsy Olmedo MD 1022 1:90 PM

## 2019-07-16 NOTE — PROCEDURES
Angi Hannah, a [id number] at 34w4d with an JANA of 8/22/2019, by Ultrasound, was seen at [facility] for the following procedure(s): $Procedure Type: ABNER]         4 Quadrant ABNER  ABNER Q1 (cm): 4 6 cm  ABNER Q2 (cm): 2 4 cm  ABNER Q3 (cm): 4 9 cm  ABNER Q4 (cm): 2 5 cm  ABNER TOTAL (cm): 14 4 cm

## 2019-07-17 ENCOUNTER — HOSPITAL ENCOUNTER (OUTPATIENT)
Facility: HOSPITAL | Age: 39
Discharge: HOME | End: 2019-07-17
Attending: OBSTETRICS & GYNECOLOGY | Admitting: OBSTETRICS & GYNECOLOGY
Payer: COMMERCIAL

## 2019-07-17 ENCOUNTER — TELEPHONE (OUTPATIENT)
Dept: OBGYN CLINIC | Facility: CLINIC | Age: 39
End: 2019-07-17

## 2019-07-17 VITALS
TEMPERATURE: 98.7 F | RESPIRATION RATE: 16 BRPM | SYSTOLIC BLOOD PRESSURE: 131 MMHG | DIASTOLIC BLOOD PRESSURE: 56 MMHG | BODY MASS INDEX: 31.76 KG/M2 | HEART RATE: 80 BPM | WEIGHT: 186 LBS | OXYGEN SATURATION: 100 % | HEIGHT: 64 IN

## 2019-07-17 LAB
BILIRUB UR QL STRIP: NEGATIVE
CLARITY UR: CLEAR
COLOR UR: YELLOW
GLUCOSE UR STRIP-MCNC: NEGATIVE MG/DL
HGB UR QL STRIP.AUTO: NEGATIVE
KETONES UR STRIP-MCNC: NEGATIVE MG/DL
LEUKOCYTE ESTERASE UR QL STRIP: NEGATIVE
NITRITE UR QL STRIP: NEGATIVE
PH UR STRIP.AUTO: 7 [PH]
PROT UR STRIP-MCNC: NEGATIVE MG/DL
SP GR UR STRIP.AUTO: 1.02 (ref 1–1.03)
UROBILINOGEN UR QL STRIP.AUTO: 0.2 E.U./DL

## 2019-07-17 PROCEDURE — 81003 URINALYSIS AUTO W/O SCOPE: CPT | Performed by: OBSTETRICS & GYNECOLOGY

## 2019-07-17 PROCEDURE — NC001 PR NO CHARGE: Performed by: OBSTETRICS & GYNECOLOGY

## 2019-07-17 PROCEDURE — 87086 URINE CULTURE/COLONY COUNT: CPT | Performed by: OBSTETRICS & GYNECOLOGY

## 2019-07-17 PROCEDURE — G0463 HOSPITAL OUTPT CLINIC VISIT: HCPCS

## 2019-07-17 PROCEDURE — 99212 OFFICE O/P EST SF 10 MIN: CPT

## 2019-07-17 NOTE — ED NOTES
Spoke to [facility] charge RN, to be taken to [facility] floor for examination        Melanie Warren RN  07/17/19 [id number]

## 2019-07-17 NOTE — DISCHARGE INSTRUCTIONS
You can take tylenol extra-strength for pain as needed  Place cold/warm compresses on the affected area as needed

## 2019-07-17 NOTE — PROGRESS NOTES
Dr Meli Minaya reviews EFM tracing  Patient discharged to home by doctor  Discharge instructions/precautions reviewed with patient by Dr Meli Minaya  Patient verbalizes understanding & denies further questions at this time

## 2019-07-18 LAB — BACTERIA UR CULT: NORMAL

## 2019-07-22 ENCOUNTER — ROUTINE PRENATAL (OUTPATIENT)
Dept: OBGYN CLINIC | Facility: CLINIC | Age: 39
End: 2019-07-22

## 2019-07-22 VITALS
SYSTOLIC BLOOD PRESSURE: 116 MMHG | BODY MASS INDEX: 32.1 KG/M2 | DIASTOLIC BLOOD PRESSURE: 84 MMHG | HEART RATE: 82 BPM | WEIGHT: 187 LBS

## 2019-07-22 DIAGNOSIS — O09.523 AMA (ADVANCED MATERNAL AGE) MULTIGRAVIDA 35+, THIRD TRIMESTER: ICD-10-CM

## 2019-07-22 DIAGNOSIS — Z98.891 HISTORY OF 2 CESAREAN SECTIONS: ICD-10-CM

## 2019-07-22 DIAGNOSIS — Z3A.35 35 WEEKS GESTATION OF PREGNANCY: Primary | ICD-10-CM

## 2019-07-22 DIAGNOSIS — O22.03 VARICOSE VEINS OF LEGS IN PREGNANCY, THIRD TRIMESTER: ICD-10-CM

## 2019-07-22 PROCEDURE — PNV: Performed by: OBSTETRICS & GYNECOLOGY

## 2019-07-22 NOTE — PROGRESS NOTES
IUP at 35 weeks and 4 days  Patient with history of  section x2  She is scheduled for elective repeat  with tubal ligation on 2019 at 07:30  Patient has occasional irregular contractions feels some pelvic pressure denies leakage of fluid or bleeding  GBS collected today reviewed signs of labor and kick counts  All questions answered follow-up in 1 week and p r n

## 2019-07-25 LAB — GP B STREP DNA SPEC QL NAA+PROBE: NEGATIVE

## 2019-07-29 ENCOUNTER — ROUTINE PRENATAL (OUTPATIENT)
Dept: OBGYN CLINIC | Facility: CLINIC | Age: 39
End: 2019-07-29

## 2019-07-29 VITALS
BODY MASS INDEX: 32.1 KG/M2 | WEIGHT: 188 LBS | HEIGHT: 64 IN | SYSTOLIC BLOOD PRESSURE: 118 MMHG | DIASTOLIC BLOOD PRESSURE: 80 MMHG

## 2019-07-29 DIAGNOSIS — O09.523 MULTIGRAVIDA OF ADVANCED MATERNAL AGE IN THIRD TRIMESTER: ICD-10-CM

## 2019-07-29 DIAGNOSIS — Z3A.36 36 WEEKS GESTATION OF PREGNANCY: Primary | ICD-10-CM

## 2019-07-29 DIAGNOSIS — Z98.891 HISTORY OF 2 CESAREAN SECTIONS: ICD-10-CM

## 2019-07-29 DIAGNOSIS — Z30.2 REQUEST FOR STERILIZATION: ICD-10-CM

## 2019-07-29 PROCEDURE — PNV: Performed by: OBSTETRICS & GYNECOLOGY

## 2019-07-29 NOTE — PROGRESS NOTES
IUP at 36 weeks and 4 days  Patient scheduled for elective repeat  section with bilateral tubal sterilization on 2019  Patient reports positive fetal movements   Denies contractions leakage of fluid or bleeding  GBS was negative  Blood pressure and weight gains appropriate all questions answered follow-up in 1 week and p r n     Reviewed signs of  labor and kick counts

## 2019-08-05 ENCOUNTER — ROUTINE PRENATAL (OUTPATIENT)
Dept: OBGYN CLINIC | Facility: CLINIC | Age: 39
End: 2019-08-05

## 2019-08-05 VITALS
SYSTOLIC BLOOD PRESSURE: 90 MMHG | BODY MASS INDEX: 32.23 KG/M2 | DIASTOLIC BLOOD PRESSURE: 62 MMHG | WEIGHT: 188.8 LBS | HEIGHT: 64 IN

## 2019-08-05 DIAGNOSIS — Z34.83 PRENATAL CARE, SUBSEQUENT PREGNANCY, THIRD TRIMESTER: Primary | ICD-10-CM

## 2019-08-05 PROCEDURE — PNV: Performed by: OBSTETRICS & GYNECOLOGY

## 2019-08-06 NOTE — PROGRESS NOTES
Pt is doing well  No complaints   Feeling well   37 weeks pregnant     GBS - negative     For repeat c section - 8/16/2019

## 2019-08-08 ENCOUNTER — TELEPHONE (OUTPATIENT)
Dept: OBGYN CLINIC | Facility: CLINIC | Age: 39
End: 2019-08-08

## 2019-08-08 NOTE — TELEPHONE ENCOUNTER
Patient was called about her  time being moved back to 11:00 on Aug 16th  Patient verbalized understanding  She has another appointment with Dr Dez Corbett on 19 and will review this at that time  No further questions

## 2019-08-13 ENCOUNTER — ROUTINE PRENATAL (OUTPATIENT)
Dept: OBGYN CLINIC | Facility: CLINIC | Age: 39
End: 2019-08-13

## 2019-08-13 VITALS
BODY MASS INDEX: 32.78 KG/M2 | HEIGHT: 64 IN | DIASTOLIC BLOOD PRESSURE: 70 MMHG | SYSTOLIC BLOOD PRESSURE: 112 MMHG | WEIGHT: 192 LBS

## 2019-08-13 DIAGNOSIS — Z3A.38 38 WEEKS GESTATION OF PREGNANCY: Primary | ICD-10-CM

## 2019-08-13 DIAGNOSIS — Z98.891 HISTORY OF 2 CESAREAN SECTIONS: ICD-10-CM

## 2019-08-13 DIAGNOSIS — Z30.2 REQUEST FOR STERILIZATION: ICD-10-CM

## 2019-08-13 DIAGNOSIS — O09.523 AMA (ADVANCED MATERNAL AGE) MULTIGRAVIDA 35+, THIRD TRIMESTER: ICD-10-CM

## 2019-08-13 PROCEDURE — PNV: Performed by: OBSTETRICS & GYNECOLOGY

## 2019-08-13 NOTE — PROGRESS NOTES
IUP at 30 weeks and 5 days  Patient with history of  section x2  She is scheduled to have elective repeat  section with bilateral tubal ligation on 2019  Patient does have occasional contractions reports positive fetal movements denies leakage of fluid or bleeding  Operative consent was previously obtained all questions answered

## 2019-08-16 ENCOUNTER — ANESTHESIA EVENT (INPATIENT)
Dept: LABOR AND DELIVERY | Facility: HOSPITAL | Age: 39
End: 2019-08-16
Payer: COMMERCIAL

## 2019-08-16 ENCOUNTER — ANESTHESIA (INPATIENT)
Dept: LABOR AND DELIVERY | Facility: HOSPITAL | Age: 39
End: 2019-08-16
Payer: COMMERCIAL

## 2019-08-16 ENCOUNTER — HOSPITAL ENCOUNTER (INPATIENT)
Facility: HOSPITAL | Age: 39
LOS: 3 days | Discharge: HOME | End: 2019-08-19
Attending: OBSTETRICS & GYNECOLOGY | Admitting: OBSTETRICS & GYNECOLOGY
Payer: COMMERCIAL

## 2019-08-16 DIAGNOSIS — Z98.891 STATUS POST REPEAT LOW TRANSVERSE CESAREAN SECTION: Primary | ICD-10-CM

## 2019-08-16 DIAGNOSIS — Z3A.39 39 WEEKS GESTATION OF PREGNANCY: ICD-10-CM

## 2019-08-16 PROBLEM — Z98.51 HISTORY OF BILATERAL TUBAL LIGATION: Status: ACTIVE | Noted: 2019-08-16

## 2019-08-16 LAB
ABO GROUP BLD: NORMAL
BASE EXCESS BLDCOA CALC-SCNC: -0.4 MMOL/L (ref 3–11)
BASE EXCESS BLDCOV CALC-SCNC: -0.5 MMOL/L (ref 1–9)
BASOPHILS # BLD AUTO: 0.03 THOUSANDS/ΜL (ref 0–0.1)
BASOPHILS NFR BLD AUTO: 1 % (ref 0–1)
BLD GP AB SCN SERPL QL: NEGATIVE
EOSINOPHIL # BLD AUTO: 0.08 THOUSAND/ΜL (ref 0–0.61)
EOSINOPHIL NFR BLD AUTO: 2 % (ref 0–6)
ERYTHROCYTE [DISTWIDTH] IN BLOOD BY AUTOMATED COUNT: 14.2 % (ref 11.6–15.1)
HCO3 BLDCOA-SCNC: 25.3 MMOL/L (ref 17.3–27.3)
HCO3 BLDCOV-SCNC: 25.5 MMOL/L (ref 12.2–28.6)
HCT VFR BLD AUTO: 41.4 % (ref 34.8–46.1)
HGB BLD-MCNC: 13.1 G/DL (ref 11.5–15.4)
IMM GRANULOCYTES # BLD AUTO: 0.03 THOUSAND/UL (ref 0–0.2)
IMM GRANULOCYTES NFR BLD AUTO: 1 % (ref 0–2)
LYMPHOCYTES # BLD AUTO: 1.59 THOUSANDS/ΜL (ref 0.6–4.47)
LYMPHOCYTES NFR BLD AUTO: 36 % (ref 14–44)
MCH RBC QN AUTO: 29 PG (ref 26.8–34.3)
MCHC RBC AUTO-ENTMCNC: 31.6 G/DL (ref 31.4–37.4)
MCV RBC AUTO: 92 FL (ref 82–98)
MONOCYTES # BLD AUTO: 0.5 THOUSAND/ΜL (ref 0.17–1.22)
MONOCYTES NFR BLD AUTO: 11 % (ref 4–12)
NEUTROPHILS # BLD AUTO: 2.2 THOUSANDS/ΜL (ref 1.85–7.62)
NEUTS SEG NFR BLD AUTO: 49 % (ref 43–75)
NRBC BLD AUTO-RTO: 0 /100 WBCS
O2 CT VFR BLDCOA CALC: 14.3 ML/DL
OXYHGB MFR BLDCOA: 61.4 %
OXYHGB MFR BLDCOV: 61.5 %
PCO2 BLDCOA: 45.2 MM[HG] (ref 30–60)
PCO2 BLDCOV: 46.3 MM HG (ref 27–43)
PH BLDCOA: 7.37 [PH] (ref 7.23–7.43)
PH BLDCOV: 7.36 [PH] (ref 7.19–7.49)
PLATELET # BLD AUTO: 264 THOUSANDS/UL (ref 149–390)
PMV BLD AUTO: 10.2 FL (ref 8.9–12.7)
PO2 BLDCOA: 25.3 MM HG (ref 5–25)
PO2 BLDCOV: 25.5 MM HG (ref 15–45)
RBC # BLD AUTO: 4.51 MILLION/UL (ref 3.81–5.12)
RH BLD: POSITIVE
SAO2 % BLDCOV: 14.2 ML/DL
SPECIMEN EXPIRATION DATE: NORMAL
WBC # BLD AUTO: 4.43 THOUSAND/UL (ref 4.31–10.16)

## 2019-08-16 PROCEDURE — 94762 N-INVAS EAR/PLS OXIMTRY CONT: CPT

## 2019-08-16 PROCEDURE — 88302 TISSUE EXAM BY PATHOLOGIST: CPT | Performed by: PATHOLOGY

## 2019-08-16 PROCEDURE — 0UL70ZZ OCCLUSION OF BILATERAL FALLOPIAN TUBES, OPEN APPROACH: ICD-10-PCS | Performed by: OBSTETRICS & GYNECOLOGY

## 2019-08-16 PROCEDURE — 86901 BLOOD TYPING SEROLOGIC RH(D): CPT | Performed by: OBSTETRICS & GYNECOLOGY

## 2019-08-16 PROCEDURE — 59510 CESAREAN DELIVERY: CPT | Performed by: OBSTETRICS & GYNECOLOGY

## 2019-08-16 PROCEDURE — 86850 RBC ANTIBODY SCREEN: CPT | Performed by: OBSTETRICS & GYNECOLOGY

## 2019-08-16 PROCEDURE — 58611 LIGATE OVIDUCT(S) ADD-ON: CPT | Performed by: OBSTETRICS & GYNECOLOGY

## 2019-08-16 PROCEDURE — 86900 BLOOD TYPING SEROLOGIC ABO: CPT | Performed by: OBSTETRICS & GYNECOLOGY

## 2019-08-16 PROCEDURE — 4A1HXCZ MONITORING OF PRODUCTS OF CONCEPTION, CARDIAC RATE, EXTERNAL APPROACH: ICD-10-PCS | Performed by: OBSTETRICS & GYNECOLOGY

## 2019-08-16 PROCEDURE — 82805 BLOOD GASES W/O2 SATURATION: CPT | Performed by: OBSTETRICS & GYNECOLOGY

## 2019-08-16 PROCEDURE — 85025 COMPLETE CBC W/AUTO DIFF WBC: CPT | Performed by: OBSTETRICS & GYNECOLOGY

## 2019-08-16 PROCEDURE — 86592 SYPHILIS TEST NON-TREP QUAL: CPT | Performed by: OBSTETRICS & GYNECOLOGY

## 2019-08-16 RX ORDER — HYDROMORPHONE HCL/PF 1 MG/ML
0.5 SYRINGE (ML) INJECTION
Status: DISCONTINUED | OUTPATIENT
Start: 2019-08-16 | End: 2019-08-16 | Stop reason: HOSPADM

## 2019-08-16 RX ORDER — MORPHINE SULFATE 0.5 MG/ML
INJECTION, SOLUTION EPIDURAL; INTRATHECAL; INTRAVENOUS AS NEEDED
Status: DISCONTINUED | OUTPATIENT
Start: 2019-08-16 | End: 2019-08-16 | Stop reason: SURG

## 2019-08-16 RX ORDER — KETAMINE HCL IN NACL, ISO-OSM 100MG/10ML
SYRINGE (ML) INJECTION AS NEEDED
Status: DISCONTINUED | OUTPATIENT
Start: 2019-08-16 | End: 2019-08-16 | Stop reason: SURG

## 2019-08-16 RX ORDER — ONDANSETRON 2 MG/ML
4 INJECTION INTRAMUSCULAR; INTRAVENOUS EVERY 4 HOURS PRN
Status: DISCONTINUED | OUTPATIENT
Start: 2019-08-16 | End: 2019-08-16 | Stop reason: SDUPTHER

## 2019-08-16 RX ORDER — ONDANSETRON 2 MG/ML
4 INJECTION INTRAMUSCULAR; INTRAVENOUS EVERY 6 HOURS PRN
Status: DISCONTINUED | OUTPATIENT
Start: 2019-08-17 | End: 2019-08-19 | Stop reason: HOSPADM

## 2019-08-16 RX ORDER — KETAMINE HCL IN NACL, ISO-OSM 100MG/10ML
SYRINGE (ML) INJECTION
Status: COMPLETED
Start: 2019-08-16 | End: 2019-08-16

## 2019-08-16 RX ORDER — BUPIVACAINE HYDROCHLORIDE 7.5 MG/ML
INJECTION, SOLUTION INTRASPINAL AS NEEDED
Status: DISCONTINUED | OUTPATIENT
Start: 2019-08-16 | End: 2019-08-16 | Stop reason: SURG

## 2019-08-16 RX ORDER — MIDAZOLAM HYDROCHLORIDE 1 MG/ML
INJECTION INTRAMUSCULAR; INTRAVENOUS AS NEEDED
Status: DISCONTINUED | OUTPATIENT
Start: 2019-08-16 | End: 2019-08-16 | Stop reason: SURG

## 2019-08-16 RX ORDER — ONDANSETRON 2 MG/ML
4 INJECTION INTRAMUSCULAR; INTRAVENOUS EVERY 4 HOURS PRN
Status: ACTIVE | OUTPATIENT
Start: 2019-08-16 | End: 2019-08-17

## 2019-08-16 RX ORDER — PHENYLEPHRINE HCL IN 0.9% NACL 1 MG/10 ML
SYRINGE (ML) INTRAVENOUS
Status: DISPENSED
Start: 2019-08-16 | End: 2019-08-16

## 2019-08-16 RX ORDER — PROMETHAZINE HYDROCHLORIDE 25 MG/ML
12.5 INJECTION, SOLUTION INTRAMUSCULAR; INTRAVENOUS EVERY 4 HOURS PRN
Status: DISCONTINUED | OUTPATIENT
Start: 2019-08-16 | End: 2019-08-19 | Stop reason: HOSPADM

## 2019-08-16 RX ORDER — CALCIUM CARBONATE 200(500)MG
1000 TABLET,CHEWABLE ORAL DAILY PRN
Status: DISCONTINUED | OUTPATIENT
Start: 2019-08-16 | End: 2019-08-19 | Stop reason: HOSPADM

## 2019-08-16 RX ORDER — DIPHENHYDRAMINE HYDROCHLORIDE 50 MG/ML
25 INJECTION INTRAMUSCULAR; INTRAVENOUS EVERY 6 HOURS PRN
Status: DISCONTINUED | OUTPATIENT
Start: 2019-08-16 | End: 2019-08-17

## 2019-08-16 RX ORDER — ONDANSETRON 2 MG/ML
INJECTION INTRAMUSCULAR; INTRAVENOUS AS NEEDED
Status: DISCONTINUED | OUTPATIENT
Start: 2019-08-16 | End: 2019-08-16 | Stop reason: SURG

## 2019-08-16 RX ORDER — OXYCODONE HYDROCHLORIDE AND ACETAMINOPHEN 5; 325 MG/1; MG/1
2 TABLET ORAL EVERY 6 HOURS PRN
Status: DISCONTINUED | OUTPATIENT
Start: 2019-08-16 | End: 2019-08-16 | Stop reason: SDUPTHER

## 2019-08-16 RX ORDER — MIDAZOLAM HYDROCHLORIDE 1 MG/ML
INJECTION INTRAMUSCULAR; INTRAVENOUS
Status: COMPLETED
Start: 2019-08-16 | End: 2019-08-16

## 2019-08-16 RX ORDER — NALBUPHINE HCL 10 MG/ML
2 AMPUL (ML) INJECTION
Status: ACTIVE | OUTPATIENT
Start: 2019-08-16 | End: 2019-08-17

## 2019-08-16 RX ORDER — DOCUSATE SODIUM 100 MG/1
100 CAPSULE, LIQUID FILLED ORAL 2 TIMES DAILY
Status: DISCONTINUED | OUTPATIENT
Start: 2019-08-16 | End: 2019-08-19 | Stop reason: HOSPADM

## 2019-08-16 RX ORDER — OXYTOCIN/RINGER'S LACTATE 30/500 ML
62.5 PLASTIC BAG, INJECTION (ML) INTRAVENOUS ONCE
Status: COMPLETED | OUTPATIENT
Start: 2019-08-16 | End: 2019-08-16

## 2019-08-16 RX ORDER — SODIUM CHLORIDE, SODIUM LACTATE, POTASSIUM CHLORIDE, CALCIUM CHLORIDE 600; 310; 30; 20 MG/100ML; MG/100ML; MG/100ML; MG/100ML
125 INJECTION, SOLUTION INTRAVENOUS CONTINUOUS
Status: DISCONTINUED | OUTPATIENT
Start: 2019-08-16 | End: 2019-08-19 | Stop reason: HOSPADM

## 2019-08-16 RX ORDER — ONDANSETRON 2 MG/ML
4 INJECTION INTRAMUSCULAR; INTRAVENOUS EVERY 8 HOURS PRN
Status: DISCONTINUED | OUTPATIENT
Start: 2019-08-16 | End: 2019-08-16

## 2019-08-16 RX ORDER — OXYTOCIN/RINGER'S LACTATE 30/500 ML
PLASTIC BAG, INJECTION (ML) INTRAVENOUS CONTINUOUS PRN
Status: DISCONTINUED | OUTPATIENT
Start: 2019-08-16 | End: 2019-08-16 | Stop reason: SURG

## 2019-08-16 RX ORDER — NALOXONE HYDROCHLORIDE 0.4 MG/ML
0.1 INJECTION, SOLUTION INTRAMUSCULAR; INTRAVENOUS; SUBCUTANEOUS
Status: DISCONTINUED | OUTPATIENT
Start: 2019-08-16 | End: 2019-08-16 | Stop reason: SDUPTHER

## 2019-08-16 RX ORDER — ONDANSETRON 2 MG/ML
4 INJECTION INTRAMUSCULAR; INTRAVENOUS EVERY 8 HOURS PRN
Status: DISCONTINUED | OUTPATIENT
Start: 2019-08-16 | End: 2019-08-16 | Stop reason: SDUPTHER

## 2019-08-16 RX ORDER — SODIUM CHLORIDE, SODIUM LACTATE, POTASSIUM CHLORIDE, CALCIUM CHLORIDE 600; 310; 30; 20 MG/100ML; MG/100ML; MG/100ML; MG/100ML
125 INJECTION, SOLUTION INTRAVENOUS CONTINUOUS
Status: DISCONTINUED | OUTPATIENT
Start: 2019-08-16 | End: 2019-08-16

## 2019-08-16 RX ORDER — MORPHINE SULFATE 0.5 MG/ML
INJECTION, SOLUTION EPIDURAL; INTRATHECAL; INTRAVENOUS
Status: COMPLETED
Start: 2019-08-16 | End: 2019-08-16

## 2019-08-16 RX ORDER — NALBUPHINE HCL 10 MG/ML
2 AMPUL (ML) INJECTION
Status: DISCONTINUED | OUTPATIENT
Start: 2019-08-16 | End: 2019-08-16 | Stop reason: SDUPTHER

## 2019-08-16 RX ORDER — ACETAMINOPHEN 325 MG/1
650 TABLET ORAL EVERY 4 HOURS PRN
Status: DISCONTINUED | OUTPATIENT
Start: 2019-08-16 | End: 2019-08-19 | Stop reason: HOSPADM

## 2019-08-16 RX ORDER — OXYCODONE HYDROCHLORIDE AND ACETAMINOPHEN 5; 325 MG/1; MG/1
2 TABLET ORAL EVERY 6 HOURS PRN
Status: DISCONTINUED | OUTPATIENT
Start: 2019-08-16 | End: 2019-08-17

## 2019-08-16 RX ORDER — IBUPROFEN 600 MG/1
600 TABLET ORAL EVERY 6 HOURS PRN
Status: DISCONTINUED | OUTPATIENT
Start: 2019-08-16 | End: 2019-08-19 | Stop reason: HOSPADM

## 2019-08-16 RX ORDER — NALOXONE HYDROCHLORIDE 0.4 MG/ML
0.1 INJECTION, SOLUTION INTRAMUSCULAR; INTRAVENOUS; SUBCUTANEOUS
Status: ACTIVE | OUTPATIENT
Start: 2019-08-16 | End: 2019-08-17

## 2019-08-16 RX ORDER — KETOROLAC TROMETHAMINE 30 MG/ML
30 INJECTION, SOLUTION INTRAMUSCULAR; INTRAVENOUS EVERY 6 HOURS
Status: DISCONTINUED | OUTPATIENT
Start: 2019-08-16 | End: 2019-08-16

## 2019-08-16 RX ORDER — DIPHENHYDRAMINE HYDROCHLORIDE 50 MG/ML
25 INJECTION INTRAMUSCULAR; INTRAVENOUS EVERY 6 HOURS PRN
Status: DISCONTINUED | OUTPATIENT
Start: 2019-08-16 | End: 2019-08-16 | Stop reason: SDUPTHER

## 2019-08-16 RX ORDER — CEFAZOLIN SODIUM 2 G/50ML
2000 SOLUTION INTRAVENOUS ONCE
Status: COMPLETED | OUTPATIENT
Start: 2019-08-16 | End: 2019-08-16

## 2019-08-16 RX ADMIN — ONDANSETRON 4 MG: 2 INJECTION INTRAMUSCULAR; INTRAVENOUS at 12:26

## 2019-08-16 RX ADMIN — SODIUM CHLORIDE, SODIUM LACTATE, POTASSIUM CHLORIDE, AND CALCIUM CHLORIDE: .6; .31; .03; .02 INJECTION, SOLUTION INTRAVENOUS at 13:13

## 2019-08-16 RX ADMIN — MORPHINE SULFATE 1 MG: 0.5 INJECTION, SOLUTION EPIDURAL; INTRATHECAL; INTRAVENOUS at 12:59

## 2019-08-16 RX ADMIN — Medication 62.5 MILLI-UNITS/MIN: at 15:57

## 2019-08-16 RX ADMIN — Medication 15 MG: at 13:07

## 2019-08-16 RX ADMIN — Medication: at 16:52

## 2019-08-16 RX ADMIN — MIDAZOLAM 2 MG: 1 INJECTION INTRAMUSCULAR; INTRAVENOUS at 12:57

## 2019-08-16 RX ADMIN — HYDROMORPHONE HYDROCHLORIDE 0.5 MG: 1 INJECTION, SOLUTION INTRAMUSCULAR; INTRAVENOUS; SUBCUTANEOUS at 14:33

## 2019-08-16 RX ADMIN — CEFAZOLIN SODIUM 2000 MG: 2 SOLUTION INTRAVENOUS at 12:11

## 2019-08-16 RX ADMIN — SODIUM CHLORIDE, SODIUM LACTATE, POTASSIUM CHLORIDE, AND CALCIUM CHLORIDE 999 ML/HR: .6; .31; .03; .02 INJECTION, SOLUTION INTRAVENOUS at 11:45

## 2019-08-16 RX ADMIN — MORPHINE SULFATE 0.15 MG: 0.5 INJECTION, SOLUTION EPIDURAL; INTRATHECAL; INTRAVENOUS at 12:24

## 2019-08-16 RX ADMIN — SODIUM CHLORIDE, SODIUM LACTATE, POTASSIUM CHLORIDE, AND CALCIUM CHLORIDE 125 ML/HR: .6; .31; .03; .02 INJECTION, SOLUTION INTRAVENOUS at 15:56

## 2019-08-16 RX ADMIN — BUPIVACAINE HYDROCHLORIDE IN DEXTROSE 1.8 ML: 7.5 INJECTION, SOLUTION SUBARACHNOID at 12:24

## 2019-08-16 RX ADMIN — DIPHENHYDRAMINE HYDROCHLORIDE 25 MG: 50 INJECTION, SOLUTION INTRAMUSCULAR; INTRAVENOUS at 19:33

## 2019-08-16 RX ADMIN — SODIUM CHLORIDE, SODIUM LACTATE, POTASSIUM CHLORIDE, AND CALCIUM CHLORIDE 125 ML/HR: .6; .31; .03; .02 INJECTION, SOLUTION INTRAVENOUS at 23:41

## 2019-08-16 RX ADMIN — Medication 250 MILLI-UNITS/MIN: at 12:49

## 2019-08-16 RX ADMIN — DOCUSATE SODIUM 100 MG: 100 CAPSULE, LIQUID FILLED ORAL at 18:28

## 2019-08-16 RX ADMIN — Medication 10 MG: at 13:02

## 2019-08-16 RX ADMIN — SODIUM CHLORIDE, SODIUM LACTATE, POTASSIUM CHLORIDE, AND CALCIUM CHLORIDE 1000 ML: .6; .31; .03; .02 INJECTION, SOLUTION INTRAVENOUS at 10:44

## 2019-08-16 RX ADMIN — SODIUM CHLORIDE, SODIUM LACTATE, POTASSIUM CHLORIDE, AND CALCIUM CHLORIDE 999 ML/HR: .6; .31; .03; .02 INJECTION, SOLUTION INTRAVENOUS at 10:42

## 2019-08-16 RX ADMIN — PHENYLEPHRINE HYDROCHLORIDE 100 MCG: 10 INJECTION INTRAVENOUS at 12:30

## 2019-08-16 NOTE — PROGRESS NOTES
Called [facility] for PCA pump in order to set up per physician order  They say no pump is available-- all are in use for this facility  Dr Radha Mayo MD notified

## 2019-08-16 NOTE — LACTATION NOTE
This note was copied from a baby's chart  CONSULT - LACTATION  Baby Boy (Malini Billings) Lashay [place] 0 days male MRN: [id number]    [facility] Room / Bed: L&D 314(N)/L&D 314(N) Encounter: [id number]    Maternal Information     MOTHER:  Edison Lima  Maternal Age: 45 y o    OB History: #: 1, Date: 12, Sex: Female, Weight: 3629 g (8 lb), GA: 41w0d, Delivery: , Low Transverse, Apgar1: None, Apgar5: None, Living: Living, Birth Comments: None    #: 2, Date: 12, Sex: None, Weight: None, GA: None, Delivery: None, Apgar1: None, Apgar5: None, Living: None, Birth Comments: None    #: 3, Date: 13, Sex: None, Weight: None, GA: None, Delivery: None, Apgar1: None, Apgar5: None, Living: None, Birth Comments: None    #: 4, Date: 07/21/15, Sex: Male, Weight: 3856 g (8 lb 8 oz), GA: 40w0d, Delivery: , Low Transverse, Apgar1: None, Apgar5: None, Living: Living, Birth Comments: None    #: 5, Date: 19, Sex: Male, Weight: 3660 g (8 lb 1 1 oz), GA: 39w1d, Delivery: , Low Transverse, Apgar1: 9, Apgar5: 9, Living: Living, Birth Comments: None   Previouse breast reduction surgery?  No    Lactation history:   Has patient previously breast fed: Yes   How long had patient previously breast fed: a few months with supplementation   Previous breast feeding complications: Breast/nipple pain, Low milk supply, Infant separation(back to work)     Past Surgical History:   Procedure Laterality Date     SECTION      COSMETIC SURGERY         Birth information:  YOB: 2019   Time of birth: 12:49 PM   Sex: male   Delivery type: , Low Transverse   Birth Weight: 3660 g (8 lb 1 1 oz)   Percent of Weight Change: 0%     Gestational Age: 36w3d   [unfilled]    Assessment     Breast and nipple assessment: mom does not feel up to nursing at this time     Assessment: mom does not feel up to nursing at this time    Feeding assessment: feeding well  LATCH:  Latch: Audible Swallowing:     Type of Nipple:     Comfort (Breast/Nipple):     Hold (Positioning):     LATCH Score:            Feeding recommendations:  breast feed on demand OR hand expression/pump 8 or more in 24 hours    Met with mother  Provided mother with Ready, Set, Baby booklet  Discussed Skin to Skin contact an benefits to mom and baby  Talked about the delay of the first bath until baby has adjusted  Spoke about the benefits of rooming in  Feeding on cue and what that means for recognizing infant's hunger  Avoidance of pacifiers for the first month discussed  Talked about exclusive breastfeeding for the first 6 months  Positioning and latch reviewed as well as showing images of other feeding positions  Discussed the properties of a good latch in any position  Reviewed hand/manual expression  Discussed s/s that baby is getting enough milk and some s/s that breastfeeding dyad may need further help  Gave information on common concerns, what to expect the first few weeks after delivery, preparing for other caregivers, and how partners can help  Resources for support also provided  Discussed risks for early supplementation: over feeding, longer digestion times, engorgement for mom, lower milk supply for mom, and nipple confusion  Benefits of breast feeding for infant's intestinal tract, less engorgement for mom, protection from multiple disease processes as infant develops, avoidance of over feeding for infant, less nipple confusion, and increased health benefits for mom  Information on hand expression given  Discussed benefits of knowing how to manually express breast including stimulating milk supply, softening nipple for latch and evacuating breast in the event of engorgement  Encoraged MOB  to call for assistance, questions and concerns  Extension number for inpatient lactation support provided      Amber Singh RN 8/16/2019 7:46 PM

## 2019-08-16 NOTE — OP NOTE
OPERATIVE REPORT  PATIENT NAME: Jeet Stein    :  1980  MRN: [id number]  Pt Location: [facility] L&D OR ROOM 01    SURGERY DATE: 2019    Surgeon(s) and Role:     * JOSHUA Salazar DO - Primary     * Paul Alvarado MD - Assisting    Preop Diagnosis:  Pregnancy at 39w1d  History of prior  section  Desire for repeat  section  Desire for permanent sterilization    Procedure(s) (LRB):   SECTION () REPEAT (N/A)  LIGATION/COAGULATION TUBAL (N/A)    Specimen(s):  ID Type Source Tests Collected by Time Destination   1 : PRN Tissue Fallopian Tube, Right TISSUE EXAM Mau Cooper, DO 2019 1234    2 : PRN Tissue Fallopian Tube, Left TISSUE EXAM JOSHUA Salazar, DO 2019 1234    A :  Tissue (Placenta on Hold) OB Only Placenta PLACENTA IN STORAGE JOSHUA Salazar, DO 2019 1234    B :  Cord Blood Cord BLOOD GAS, VENOUS, CORD JOSHUA Salazar, DO 2019 1234    C :  Cord Blood Cord BLOOD GAS, ARTERIAL, [address], DO 2019 1234        Estimated Blood Loss:   800cc    Quantify blood loss pending    Drains:  Urethral Catheter Non-latex 16 Fr  (Active)   Site Assessment Clean;Skin intact 2019 12:25 PM   Collection Container Standard drainage bag 2019 12:25 PM   Securement Method Securing device (Describe) 2019 12:25 PM   Number of days: 0       Anesthesia Type:   Spinal    Operative Indications:  Repeat low-transverse  section  Desire for permanent sterilization    Complications:   None    Procedure and Technique:    Operative Findings:  1  Viable male  at 56, APGARs of 9 and 9 at 1 and 5 minutes  Fetus weighted 8lb 1oz  2  Normal intact placenta with centrally inserted 3VC expressed at 1251  3  Normal uterus, bilateral tubes and ovaries    4  Blood gases:   Arterial pH: 7 336   Arterial base excess: -0 4   Venous pH: 7 358   Venous base excess: -0 5     Dense facial adhesions  3cm anterior subserosal fibroid adherent to omentum, adhesion clamped, cut, tied  Bilateral tubal ligation performed using [place] technique  Two Surgifoam squares placed on rectus layer for hemostasis      Procedure: The patient was taken to the operating room  Spinal was adequately established and 2g ancef was given for preoperative prophylaxis  The patient was then placed in a supine position with a left lateral tilt  Bennett catheter was placed in sterile fashion  Fetal heart tones were noted to be normal  The patient was then prepped with chloraprep for abdominal prep and betadine for vaginal prep and draped in the usual sterile fashion for a Pfannensteil skin incision  A time out was performed to confirm correct patient and correct procedure  An incision was made in the skin with a surgical scalpel  Sharp and blunt dissection was used to reach the level of the rectus fascia  Bovie electrocautery was utilitzed for hemostasis during this process  The fascia was incised transversely at the midline and the fascial incision was extended bilaterally using acuna scissors  The superior edge of the fascial incision was grasped with Kocher clamps, tented up and the underlying rectus muscles were dissected off bluntly and sharply using the scalpel  The inferior edge of the fascial incision was then dissection off the rectus muscles with blunt and sharp dissection  The rectus muscles were then divided at the midline  The peritoneum was identified, tented up at its upper margin taking care to avoid the bladder, and then entered bluntly  The peritoneal incision and rectus muscle were extended bilaterally bluntly with gentle traction  The bladder blade was inserted  A bladder flap was created  Then, a transverse incision was made in the lower uterine segment using a new surgical blade  The uterine incision was extended cephalad and caudal using blunt dissection    The amniotic sac was entered bluntly and the amniotic fluid was noted to be clear in color     The surgeon's hand was placed into the uterine cavity  The fetus was noted to be in vertex presentation, and the presenting part was grasped and delivered through the uterine incision with the assistance of fundal pressure  There was a loose nuchal cord that was easily reduced  The infant's oral and nasal passages were bulb suctioned  After delivery the cord was doubly clamped and cut  The infant was then passed off the table to the awaiting  staff  Venous and arterial blood gas, cord blood, and portion of cord was obtained for analysis and routine blood testing  The placenta then delivered spontaneously  The placenta was noted to be intact with a centrally inserted three-vessel cord  Oxytocin was administered by IV infusion to enhance uterine contraction  The uterus was exteriorized and cleared of all clots and debris by blunt curretage with a moist lap sponge  A 3cm anterior fibroid was found to be adherent to the omentum  This adhesion was clamped, cut, and tied using 0- vicryl  The uterine incision was reapproximated using a 0- vicryl in a running locked fashion  A second vertical imbricating stitch with 0- vicryl was applied  A figure of 8 stitch was placed for additional hemostasis in the middle portion of the hysterotomy site  The uterine incision was examined and noted to be hemostatic  At this point, our attention turned to the adnexa  The bilateral Fallopian tubes were identified  First the right Fallopian tube was identified and grasped with a Celina Matter in an avascular area and tented up  Using plain gut suture this tube was doubly ligated in a standard modified [place] technique  There was persistent oozing noted  These areas were clamped and tied using plain gut suture and hemostasis was achieved  The left Fallopian tube was identified and doubly ligated in the same manner  There was also persistent oozing   These areas were clamped and tied using plain gut suture and hemostasis was also achieved  At this point, the posterior cul-de-sac was cleared of all clots  The uterus was replaced into the abdomen and the pericolic gutters were cleared of all clots  The uterine incision and bilateral tubal ligation sites were once again reexamined and noted to be hemostatic  The rectus muscles were reapproximated due to persistent oozing  Several interrupted sutures using 3-0 vicryl were placed  Two halves of a Surgifoam block were placed onto the muscle  The fascia was then reapproximated using 0 Vicryl in a running nonlocked fashion  The subcutaneous tissue was irrigated and cleared of all clots and debris  Good hemostasis was noted achieved with Bovie electrocautery  The subcutaneous tissue was less than 2cm and not reapproximated  The skin incision was closed with 4-0 monocryl  Good hemostasis was noted  No surgical dressing was applied  All needle, sponge, and instrument counts were noted to be correct x 2 at the end of the procedure  The patient was then cleansed and transferred to the recovery room  Overall, the patient tolerated the procedure well and is currently in stable condition in the PACU with her   Dr Kamilah Gonzales was present for the entire procedure        Patient Disposition:  PACU     SIGNATURE: Mariana Block MD  DATE: 2019  TIME: 2:23 PM

## 2019-08-16 NOTE — PLAN OF CARE

## 2019-08-16 NOTE — H&P
H&P Exam - Obstetrics   Colin Davis 45 y o  female MRN: [id number]  Unit/Bed#: L&D 329-02 Encounter: [id number]      History of Present Illness     Chief Complaint: Repeat low transverse  section with bilateral tubal ligation    HPI:  Colin Davis is a 45 y o  [id number] female with an JANA of 2019, by Ultrasound at 39w1d weeks gestation who is being admitted for repeat low transverse  section with bilateral tubal ligation  Contractions: no  Loss of fluid: no  Vaginal bleeding: no  Fetal movement: yes    She is a [place] patient  PREGNANCY COMPLICATIONS:   1) History of low transverse  section  2) Advanced maternal age    OB History   [place] Para Term  AB Living   5 2 2   2 2   SAB TAB Ectopic Multiple Live Births   1   1   2      # Outcome Date GA Lbr Mirza/2nd Weight Sex Delivery Anes PTL Lv   5 Current            4 Term 07/21/15 40w0d  3856 g (8 lb 8 oz) M CS-LTranv Spinal  MARBELLA   3 Ectopic 13           2 SAB 12           1 Term 12 41w0d  3629 g (8 lb) F CS-LTranv EPI  MARBELLA      Complications: Fetal Intolerance, Failure to Progress in First Stage       Baby complications/comments: None    Review of Systems   Constitutional: Negative  Respiratory: Negative  Cardiovascular: Negative  Gastrointestinal: Negative  Genitourinary: Negative  Musculoskeletal: Negative         Historical Information   Past Medical History:   Diagnosis Date    Miscarriage     2    Varicella     Had as a child     Past Surgical History:   Procedure Laterality Date     SECTION      COSMETIC SURGERY       Social History   Social History     Substance and Sexual Activity   Alcohol Use No     Social History     Substance and Sexual Activity   Drug Use No     Social History     Tobacco Use   Smoking Status Never Smoker   Smokeless Tobacco Never Used     Family History: non-contributory    Meds/Allergies    {  Medications Prior to Admission   Medication    Elastic Bandages & Supports (MEDICAL COMPRESSION THIGH HIGH) MISC    famotidine (PEPCID) 20 mg tablet    Prenatal Vit-Fe Fumarate-FA (PRENATAL VITAMIN) 28-0 8 mg      No Known Allergies    OBJECTIVE:    Vitals:   LMP 11/18/2018 (Exact Date)   There is no height or weight on file to calculate BMI  Physical Exam   Constitutional: She is oriented to person, place, and time  She appears well-developed and well-nourished  Cardiovascular: Normal rate, regular rhythm and normal heart sounds  Pulmonary/Chest: Effort normal and breath sounds normal    Abdominal: Soft  Bowel sounds are normal    Neurological: She is alert and oriented to person, place, and time  Vitals reviewed      Fetal heart rate:   Baseline: 130  Variability: moderate   Accelerations: none  Decelerations: absent     [place]:   Absent    EFW: 8lb    GBS: Negative    Prenatal Labs:   Blood Type:   Lab Results   Component Value Date/Time    ABO Grouping O 01/07/2019 12:27 PM     , D (Rh type):   Lab Results   Component Value Date/Time    Rh Factor Positive 01/07/2019 12:27 PM     ,HCT/HGB:   Lab Results   Component Value Date/Time    Hematocrit 40 0 05/15/2019 10:22 AM    Hemoglobin 12 4 05/15/2019 10:22 AM      , MCV:   Lab Results   Component Value Date/Time    MCV 94 05/15/2019 10:22 AM      , Platelets:   Lab Results   Component Value Date/Time    Platelets 442 37/34/7481 10:22 AM      , 1 hour Glucola:   Lab Results   Component Value Date/Time    Glucose 117 05/15/2019 10:22 AM   ,  Rubella:   Lab Results   Component Value Date/Time    Rubella IgG Quant 73 2 01/07/2019 12:27 PM        , VDRL/RPR:   Lab Results   Component Value Date/Time    RPR Non-Reactive 05/15/2019 10:22 AM      , Urine Culture/Screen:   Lab Results   Component Value Date/Time    Urine Culture No Growth <1000 cfu/mL 07/17/2019 01:50 PM       , Hep B:   Lab Results   Component Value Date/Time    Hepatitis B Surface Ag Non-reactive 01/07/2019 12:27 PM     , HIV:   Lab Results Component Value Date/Time    HIV-1/HIV-2 Ab Non-Reactive 2019 12:27 PM     , Gonorrhea:   Lab Results   Component Value Date/Time    N gonorrhoeae, DNA Probe Negative 2019 12:21 PM     , Group B Strep:    Lab Results   Component Value Date/Time    Strep Grp B ILDEFONSO Negative 2019 10:13 AM          Invasive Devices     None                   Assessment/Plan     ASSESSMENT:    39yo  at 39w1d weeks gestation who is being admitted for repeat low transverse  section and bilateral tubal ligation    PLAN:   1) Admit   2) CBC, RPR, Blood Type   3) Contraception: bilateral tubal ligation   4) Anesthesia consult for spinal   5) Ancef 2 gm for ppx   6) D/w Dr Johana Oneal        This patient will be an INPATIENT and I certify the anticipated length of stay is >2 Midnights        Herb Ward MD  2019  9:28 AM

## 2019-08-16 NOTE — ANESTHESIA POSTPROCEDURE EVALUATION
Post-Op Assessment Note    CV Status:  Stable  Pain Score: 3    Pain management: adequate     Mental Status:  Alert and awake   Hydration Status:  Euvolemic   PONV Controlled:  Controlled   Airway Patency:  Patent   Post Op Vitals Reviewed: Yes      Staff: Anesthesiologist           BP      Temp      Pulse     Resp      SpO2

## 2019-08-16 NOTE — DISCHARGE SUMMARY
Discharge Summary - César Briceno 45 y o  female MRN: [id number]    Unit/Bed#: L&D 123-74 Encounter: [id number]    Admission Date: 2019     Discharge Date: 19    Admitting Attending: Dr Aramis Dias  Delivering Attending: Dr Aramis Dias  Discharge Attending: Dr Aramis Dias    Diagnosis:  Pregnancy at 39w1d  History of two low transverse  sections  Desire for permanent sterilization    Procedures: Repeat low transverse  section, bilateral tubal ligation    Complications: none apparent     Pt is a 43yo [id number] who was admitted for repeat low transverse  section and permanent sterilization at 39w1d  She underwent an uncomplicated  delivery and bilateral tubal ligation  She delivered a viable male  at 56 on 2019  Weight was 8bs 1oz with APGARs of 9 and 9 at 1 and 5 minutes  Her preoperative Hb was 13 1  Her postoperative Hb was 11 4  Her postoperative course was uncomplicated  Condition at discharge: fair     On day of discharge pain was well controlled, patient was tolerating PO, passing flatus, had not had a bowel movement  She was discharged with standard post partum/ post operative instructions to follow up with her physician in 1 week for an incision check and in 3-6 weeks for a postpartum appointment  Discharge instructions/Information to patient and family:   -Do not place anything (no partner, tampons or douche) in your vagina for 6 weeks    -You may walk for exercise for the first 6 weeks then gradually return to your usual activities    -Please do not drive for 1 week if you have no stitches and for 2 weeks if you have stitches or underwent a  delivery     -You may take baths or shower per your preference    -Please look at your bust (breasts) in the mirror daily and call for redness or tenderness or increased warmth    -Please call us for temperature > 100 4*F or 38* C, worsening pain or a foul discharge       Discharge Medications:   Prenatal vitamin daily for 6 months or the duration of nursing whichever is longer  Motrin 600 mg orally every 6 hours as needed for pain  Tylenol (over the counter) per bottle directions as needed for pain: do NOT use with percocet  Hydrocortisone cream 1% (over the counter) applied 1-2x daily to hemorrhoids as needed  Percocet as needed    Provisions for Follow-Up Care: Follow up with your doctor in 1 week for incision site check       Planned Readmission: No

## 2019-08-16 NOTE — ANESTHESIA PROCEDURE NOTES
Spinal Block    Patient location during procedure: OB  Start time: 8/16/2019 12:24 PM  Reason for block: primary anesthetic  Staffing  Anesthesiologist: Elana Schaffer DO  Performed: anesthesiologist   Preanesthetic Checklist  Completed: patient identified, site marked, surgical consent, pre-op evaluation, timeout performed, IV checked, risks and benefits discussed and monitors and equipment checked  Spinal Block  Patient position: sitting  Prep: Betadine  Patient monitoring: heart rate, continuous pulse ox and frequent blood pressure checks  Location: L3-4  Injection technique: single-shot  Needle  Needle type: pencil-tip   Needle gauge: 25 G  Needle length: 5 cm  Assessment  Injection Assessment:  positive aspiration for clear CSF    Post-procedure:  site cleaned

## 2019-08-16 NOTE — DISCHARGE INSTRUCTIONS
WHAT YOU NEED TO KNOW:   A , or  section, is abdominal surgery to deliver your baby  DISCHARGE INSTRUCTIONS:   Call 911 for any of the following:   · You feel lightheaded, short of breath, and have chest pain  · You cough up blood  Seek care immediately if:   · Blood soaks through your bandage  · Your stitches come apart  · Your arm or leg feels warm, tender, and painful  It may look swollen and red  Contact your OB if:   · You have heavy vaginal bleeding that fills 1 or more sanitary pads in 1 hour  · You have a fever  · Your incision is swollen, red, or draining pus  · You have questions or concerns about yourself or your baby  Medicines: You may  need any of the following:  · Prescription pain medicine  may be given  Ask how to take this medicine safely  · Acetaminophen  decreases pain and fever  It is available without a doctor's order  Ask how much to take and how often to take it  Follow directions  Acetaminophen can cause liver damage if not taken correctly  · NSAIDs , such as ibuprofen, help decrease swelling, pain, and fever  NSAIDs can cause stomach bleeding or kidney problems in certain people  If you take blood thinner medicine, always ask your healthcare provider if NSAIDs are safe for you  Always read the medicine label and follow directions  · Take your medicine as directed  Contact your healthcare provider if you think your medicine is not helping or if you have side effects  Tell him or her if you are allergic to any medicine  Keep a list of the medicines, vitamins, and herbs you take  Include the amounts, and when and why you take them  Bring the list or the pill bottles to follow-up visits  Carry your medicine list with you in case of an emergency  Wound care:  Carefully wash your wound with soap and water every day  Keep your wound clean and dry  Wear loose, comfortable clothes that do not rub against your wound   Ask your OB about bathing and showering  Limit activity as directed:   · Ask when it is safe for you to drive, walk up stairs, lift heavy objects, and have sex  · Ask when it is okay to exercise, and what types of exercise to do  Start slowly and do more as you get stronger  Drink liquids as directed:  Liquids help keep you hydrated after your procedure and decrease your risk for a blood clot  Ask how much liquid to drink each day and which liquids are best for you  Follow up with your OB as directed: You may need to return to have your stitches or staples removed  Write down your questions so you remember to ask them during your visits  © 2017 [address] [employer] [employer] Information is for End User's use only and may not be sold, redistributed or otherwise used for commercial purposes  All illustrations and images included in CareNotes® are the copyrighted property of [employer]  or Harsh Gusman  The above information is an  only  It is not intended as medical advice for individual conditions or treatments  Talk to your doctor, nurse or pharmacist before following any medical regimen to see if it is safe and effective for you

## 2019-08-17 LAB
ERYTHROCYTE [DISTWIDTH] IN BLOOD BY AUTOMATED COUNT: 14.1 % (ref 11.6–15.1)
HCT VFR BLD AUTO: 35.7 % (ref 34.8–46.1)
HGB BLD-MCNC: 11.4 G/DL (ref 11.5–15.4)
MCH RBC QN AUTO: 29.5 PG (ref 26.8–34.3)
MCHC RBC AUTO-ENTMCNC: 31.9 G/DL (ref 31.4–37.4)
MCV RBC AUTO: 93 FL (ref 82–98)
PLATELET # BLD AUTO: 206 THOUSANDS/UL (ref 149–390)
PMV BLD AUTO: 10.1 FL (ref 8.9–12.7)
RBC # BLD AUTO: 3.86 MILLION/UL (ref 3.81–5.12)
WBC # BLD AUTO: 7.79 THOUSAND/UL (ref 4.31–10.16)

## 2019-08-17 PROCEDURE — 85027 COMPLETE CBC AUTOMATED: CPT | Performed by: OBSTETRICS & GYNECOLOGY

## 2019-08-17 PROCEDURE — 99024 POSTOP FOLLOW-UP VISIT: CPT | Performed by: OBSTETRICS & GYNECOLOGY

## 2019-08-17 RX ORDER — OXYCODONE HYDROCHLORIDE AND ACETAMINOPHEN 5; 325 MG/1; MG/1
1 TABLET ORAL EVERY 4 HOURS PRN
Status: DISCONTINUED | OUTPATIENT
Start: 2019-08-17 | End: 2019-08-19 | Stop reason: HOSPADM

## 2019-08-17 RX ORDER — DIPHENHYDRAMINE HCL 25 MG
25 TABLET ORAL EVERY 6 HOURS PRN
Status: DISCONTINUED | OUTPATIENT
Start: 2019-08-17 | End: 2019-08-19 | Stop reason: HOSPADM

## 2019-08-17 RX ORDER — OXYCODONE HYDROCHLORIDE AND ACETAMINOPHEN 5; 325 MG/1; MG/1
2 TABLET ORAL EVERY 6 HOURS PRN
Status: DISCONTINUED | OUTPATIENT
Start: 2019-08-17 | End: 2019-08-19 | Stop reason: HOSPADM

## 2019-08-17 RX ORDER — HYDROMORPHONE HCL/PF 1 MG/ML
1 SYRINGE (ML) INJECTION EVERY 4 HOURS PRN
Status: DISCONTINUED | OUTPATIENT
Start: 2019-08-17 | End: 2019-08-19 | Stop reason: HOSPADM

## 2019-08-17 RX ADMIN — DOCUSATE SODIUM 100 MG: 100 CAPSULE, LIQUID FILLED ORAL at 10:55

## 2019-08-17 RX ADMIN — IBUPROFEN 600 MG: 600 TABLET ORAL at 10:55

## 2019-08-17 RX ADMIN — IBUPROFEN 600 MG: 600 TABLET ORAL at 22:31

## 2019-08-17 RX ADMIN — OXYCODONE HYDROCHLORIDE AND ACETAMINOPHEN 2 TABLET: 5; 325 TABLET ORAL at 16:35

## 2019-08-17 RX ADMIN — DOCUSATE SODIUM 100 MG: 100 CAPSULE, LIQUID FILLED ORAL at 18:39

## 2019-08-17 NOTE — PROGRESS NOTES
Progress Note - OB/GYN   Sunshine Koo 45 y o  female MRN: [id number]  Unit/Bed#: L&D 314-01 Encounter: [id number]    Assessment:  POD#1 s/p Repeat low transverse  section and Tubal Ligation, stable    Plan:  Routine postop care  Encourage ambulation and breastfeeding  Pain control as needed    Disposition: inpatient    Subjective/Objective   Chief Complaint:     POD#1 s/p Repeat low transverse  section and Tubal Ligation    Subjective:     Pain: minimal at incision site  Tolerating PO: yes  Voiding: yes  Flatus: no  BM: no  Ambulating: no  Breastfeeding: Breastfeeding  Chest pain: no  Shortness of breath: no  Leg pain: no  Lochia: moderate    Objective:     Vitals:  Vitals:    19 2353 19 0230 19 0300 19 0759   BP: 115/79  114/67 115/68   BP Location: Left arm   Right arm   Pulse: 81  81 95   Resp: 20  16 18   Temp: 98 7 °F (37 1 °C)  98 1 °F (36 7 °C) 98 1 °F (36 7 °C)   TempSrc: Oral  Oral Oral   SpO2: 99% 99%  97%   Weight:       Height:           Physical Exam:     Physical Exam   Constitutional: She is oriented to person, place, and time  She appears well-developed and well-nourished  No distress  Cardiovascular: Normal rate, regular rhythm, normal heart sounds and intact distal pulses  Pulmonary/Chest: Effort normal and breath sounds normal  No stridor  No respiratory distress  Abdominal: Soft  Bowel sounds are normal  She exhibits no distension  There is no tenderness  Incision clean, dry, and intact    Neurological: She is alert and oriented to person, place, and time  Skin: Skin is warm and dry  She is not diaphoretic  Psychiatric: She has a normal mood and affect  Her behavior is normal      Uterine fundus firm and non-tender, at the umbilicus       Lab, Imaging and other studies: I have personally reviewed pertinent reports        Lab Results   Component Value Date    WBC 7 79 2019    HGB 11 4 (L) 2019    HCT 35 7 2019    MCV 93 08/17/2019     08/17/2019               Benja May DO  08/17/19

## 2019-08-17 NOTE — PLAN OF CARE
Problem: BIRTH - VAGINAL/ SECTION  Goal: Fetal and maternal status remain reassuring during the birth process  Description  INTERVENTIONS:  - Monitor vital signs  - Monitor fetal heart rate  - Monitor uterine activity  - Monitor labor progression (vaginal delivery)  - DVT prophylaxis  - Antibiotic prophylaxis  Outcome: Progressing  Goal: Emotionally satisfying birthing experience for mother/fetus  Description  Interventions:  - Assess, plan, implement and evaluate the nursing care given to the patient in labor  - Advocate the philosophy that each childbirth experience is a unique experience and support the family's chosen level of involvement and control during the labor process   - Actively participate in both the patient's and family's teaching of the birth process  - Consider cultural, Roman Catholic and age-specific factors and plan care for the patient in labor  Outcome: Progressing     Problem: PAIN - ADULT  Goal: Verbalizes/displays adequate comfort level or baseline comfort level  Description  Interventions:  - Encourage patient to monitor pain and request assistance  - Assess pain using appropriate pain scale  - Administer analgesics based on type and severity of pain and evaluate response  - Implement non-pharmacological measures as appropriate and evaluate response  - Consider cultural and social influences on pain and pain management  - Notify physician/advanced practitioner if interventions unsuccessful or patient reports new pain  Outcome: Progressing     Problem: INFECTION - ADULT  Goal: Absence or prevention of progression during hospitalization  Description  INTERVENTIONS:  - Assess and monitor for signs and symptoms of infection  - Monitor lab/diagnostic results  - Monitor all insertion sites, i e  indwelling lines, tubes, and drains  - Monitor endotracheal if appropriate and nasal secretions for changes in amount and color  - [place] appropriate cooling/warming therapies per order  - Administer medications as ordered  - Instruct and encourage patient and family to use good hand hygiene technique  - Identify and instruct in appropriate isolation precautions for identified infection/condition  Outcome: Progressing     Problem: SAFETY ADULT  Goal: Patient will remain free of falls  Description  INTERVENTIONS:  - Assess patient frequently for physical needs  -  Identify cognitive and physical deficits and behaviors that affect risk of falls    -  [place] fall precautions as indicated by assessment   - Educate patient/family on patient safety including physical limitations  - Instruct patient to call for assistance with activity based on assessment  - Modify environment to reduce risk of injury  - Consider OT/PT consult to assist with strengthening/mobility  Outcome: Progressing  Goal: Maintain or return to baseline ADL function  Description  INTERVENTIONS:  -  Assess patient's ability to carry out ADLs; assess patient's baseline for ADL function and identify physical deficits which impact ability to perform ADLs (bathing, care of mouth/teeth, toileting, grooming, dressing, etc )  - Assess/evaluate cause of self-care deficits   - Assess range of motion  - Assess patient's mobility; develop plan if impaired  - Assess patient's need for assistive devices and provide as appropriate  - Encourage maximum independence but intervene and supervise when necessary  - Involve family in performance of ADLs  - Assess for home care needs following discharge   - Consider OT consult to assist with ADL evaluation and planning for discharge  - Provide patient education as appropriate  Outcome: Progressing  Goal: Maintain or return mobility status to optimal level  Description  INTERVENTIONS:  - Assess patient's baseline mobility status (ambulation, transfers, stairs, etc )    - Identify cognitive and physical deficits and behaviors that affect mobility  - Identify mobility aids required to assist with transfers and/or ambulation (gait belt, sit-to-stand, lift, walker, cane, etc )  - [place] fall precautions as indicated by assessment  - Record patient progress and toleration of activity level on Mobility SBAR; progress patient to next Phase/Stage  - Instruct patient to call for assistance with activity based on assessment  - Consider rehabilitation consult to assist with strengthening/weightbearing, etc   Outcome: Progressing     Problem: Knowledge Deficit  Goal: Patient/family/caregiver demonstrates understanding of disease process, treatment plan, medications, and discharge instructions  Description  Complete learning assessment and assess knowledge base    Interventions:  - Provide teaching at level of understanding  - Provide teaching via preferred learning methods  Outcome: Progressing     Problem: DISCHARGE PLANNING  Goal: Discharge to home or other facility with appropriate resources  Description  INTERVENTIONS:  - Identify barriers to discharge w/patient and caregiver  - Arrange for needed discharge resources and transportation as appropriate  - Identify discharge learning needs (meds, wound care, etc )  - Arrange for interpretive services to assist at discharge as needed  - Refer to Case Management Department for coordinating discharge planning if the patient needs post-hospital services based on physician/advanced practitioner order or complex needs related to functional status, cognitive ability, or social support system  Outcome: Progressing     Problem: POSTPARTUM  Goal: Experiences normal postpartum course  Description  INTERVENTIONS:  - Monitor maternal vital signs  - Assess uterine involution and lochia  Outcome: Progressing  Goal: Appropriate maternal -  bonding  Description  INTERVENTIONS:  - Identify family support  - Assess for appropriate maternal/infant bonding   -Encourage maternal/infant bonding opportunities  - Referral to  or  as needed  Outcome: Progressing  Goal: Establishment of infant feeding pattern  Description  INTERVENTIONS:  - Assess breast/bottle feeding  - Refer to lactation as needed  Outcome: Progressing  Goal: Incision(s), wounds(s) or drain site(s) healing without S/S of infection  Description  INTERVENTIONS  - Assess and document risk factors for skin impairment   - Assess and document dressing, incision, wound bed, drain sites and surrounding tissue  - Consider nutrition services referral as needed  - Oral mucous membranes remain intact  - Provide patient/ family education  Outcome: Progressing

## 2019-08-17 NOTE — LACTATION NOTE
This note was copied from a baby's chart  Experienced Mother verbalized breastfeeding is going well  States "he likes the breastmilk better"  Discussed risks for early supplementation: over feeding, longer digestion times, engorgement for mom, lower milk supply for mom, and nipple confusion  Benefits of breast feeding for infant's intestinal tract, less engorgement for mom, protection from multiple disease processes as infant develops, avoidance of over feeding for infant, less nipple confusion, and increased health benefits for mom  Enc to call for assistance as needed,phone # given

## 2019-08-18 PROCEDURE — 99024 POSTOP FOLLOW-UP VISIT: CPT | Performed by: OBSTETRICS & GYNECOLOGY

## 2019-08-18 RX ADMIN — DOCUSATE SODIUM 100 MG: 100 CAPSULE, LIQUID FILLED ORAL at 18:10

## 2019-08-18 RX ADMIN — DOCUSATE SODIUM 100 MG: 100 CAPSULE, LIQUID FILLED ORAL at 08:20

## 2019-08-18 RX ADMIN — OXYCODONE HYDROCHLORIDE AND ACETAMINOPHEN 2 TABLET: 5; 325 TABLET ORAL at 21:48

## 2019-08-18 RX ADMIN — OXYCODONE HYDROCHLORIDE AND ACETAMINOPHEN 2 TABLET: 5; 325 TABLET ORAL at 14:13

## 2019-08-18 RX ADMIN — IBUPROFEN 600 MG: 600 TABLET ORAL at 19:24

## 2019-08-18 RX ADMIN — IBUPROFEN 600 MG: 600 TABLET ORAL at 08:20

## 2019-08-18 RX ADMIN — OXYCODONE HYDROCHLORIDE AND ACETAMINOPHEN 2 TABLET: 5; 325 TABLET ORAL at 04:54

## 2019-08-18 NOTE — LACTATION NOTE
This note was copied from a baby's chart  CONSULT - LACTATION  Baby Boy Jesus Lezama) Charla Gamboa 2 days male MRN: [id number]    [facility] NURSERY Room / Bed: L&D 314(N)/L&D 314(N) Encounter: [id number]  Breastfeeding packet reviewed  Mom states breastfeeding is going well, but baby was cluster feeding last night and mom decided to give a bottle in the morning  Discussed risks for early supplementation: over feeding, longer digestion times, engorgement for mom, lower milk supply for mom, and nipple confusion  Benefits of breast feeding for infant's intestinal tract, less engorgement for mom, protection from multiple disease processes as infant develops, avoidance of over feeding for infant, less nipple confusion, and increased health benefits for mom  Encouraged skin to skin while watching for feeding cues and feeding on demand  Instructed mom to call [address] [place] [address] if needed  Maternal Information     MOTHER:  Edison Lima  Maternal Age: 45 y o    OB History: #: 1, Date: 12, Sex: Female, Weight: 3629 g (8 lb), GA: 41w0d, Delivery: , Low Transverse, Apgar1: None, Apgar5: None, Living: Living, Birth Comments: None    #: 2, Date: 12, Sex: None, Weight: None, GA: None, Delivery: None, Apgar1: None, Apgar5: None, Living: None, Birth Comments: None    #: 3, Date: 13, Sex: None, Weight: None, GA: None, Delivery: None, Apgar1: None, Apgar5: None, Living: None, Birth Comments: None    #: 4, Date: 07/21/15, Sex: Male, Weight: 3856 g (8 lb 8 oz), GA: 40w0d, Delivery: , Low Transverse, Apgar1: None, Apgar5: None, Living: Living, Birth Comments: None    #: 5, Date: 19, Sex: Male, Weight: 3660 g (8 lb 1 1 oz), GA: 39w1d, Delivery: , Low Transverse, Apgar1: 9, Apgar5: 9, Living: Living, Birth Comments: None   Previouse breast reduction surgery?  No    Lactation history:   Has patient previously breast fed: Yes   How long had patient previously breast fed: a few months with supplementation   Previous breast feeding complications: Breast/nipple pain, Low milk supply, Infant separation(back to work)     Past Surgical History:   Procedure Laterality Date     SECTION      COSMETIC SURGERY      [place]  DELIVERY ONLY N/A 2019    Procedure:  SECTION () REPEAT;  Surgeon: Albaro Murdock DO;  Location: [facility];  Service: Obstetrics    TUBAL LIGATION N/A 2019    Procedure: LIGATION/COAGULATION TUBAL;  Surgeon: Albaro Murdock DO;  Location: [facility];  Service: Obstetrics       Birth information:  YOB: 2019   Time of birth: 12:49 PM   Sex: male   Delivery type: , Low Transverse   Birth Weight: 3660 g (8 lb 1 1 oz)   Percent of Weight Change: -6%     Gestational Age: 39w1d   [unfilled]    Assessment        LATCH:  Latch:      Audible Swallowing:     Type of Nipple:     Comfort (Breast/Nipple):     Hold (Positioning):     LATCH Score:            Feeding recommendations:  breast feed on demand    Pearl Sen RN 2019 10:28 AM

## 2019-08-18 NOTE — PLAN OF CARE
Problem: BIRTH - VAGINAL/ SECTION  Goal: Fetal and maternal status remain reassuring during the birth process  Description  INTERVENTIONS:  - Monitor vital signs  - Monitor fetal heart rate  - Monitor uterine activity  - Monitor labor progression (vaginal delivery)  - DVT prophylaxis  - Antibiotic prophylaxis  Outcome: Progressing  Goal: Emotionally satisfying birthing experience for mother/fetus  Description  Interventions:  - Assess, plan, implement and evaluate the nursing care given to the patient in labor  - Advocate the philosophy that each childbirth experience is a unique experience and support the family's chosen level of involvement and control during the labor process   - Actively participate in both the patient's and family's teaching of the birth process  - Consider cultural, Christianity and age-specific factors and plan care for the patient in labor  Outcome: Progressing     Problem: PAIN - ADULT  Goal: Verbalizes/displays adequate comfort level or baseline comfort level  Description  Interventions:  - Encourage patient to monitor pain and request assistance  - Assess pain using appropriate pain scale  - Administer analgesics based on type and severity of pain and evaluate response  - Implement non-pharmacological measures as appropriate and evaluate response  - Consider cultural and social influences on pain and pain management  - Notify physician/advanced practitioner if interventions unsuccessful or patient reports new pain  Outcome: Progressing     Problem: INFECTION - ADULT  Goal: Absence or prevention of progression during hospitalization  Description  INTERVENTIONS:  - Assess and monitor for signs and symptoms of infection  - Monitor lab/diagnostic results  - Monitor all insertion sites, i e  indwelling lines, tubes, and drains  - Monitor endotracheal if appropriate and nasal secretions for changes in amount and color  - [place] appropriate cooling/warming therapies per order  - Administer medications as ordered  - Instruct and encourage patient and family to use good hand hygiene technique  - Identify and instruct in appropriate isolation precautions for identified infection/condition  Outcome: Progressing     Problem: SAFETY ADULT  Goal: Patient will remain free of falls  Description  INTERVENTIONS:  - Assess patient frequently for physical needs  -  Identify cognitive and physical deficits and behaviors that affect risk of falls    -  [place] fall precautions as indicated by assessment   - Educate patient/family on patient safety including physical limitations  - Instruct patient to call for assistance with activity based on assessment  - Modify environment to reduce risk of injury  - Consider OT/PT consult to assist with strengthening/mobility  Outcome: Progressing  Goal: Maintain or return to baseline ADL function  Description  INTERVENTIONS:  -  Assess patient's ability to carry out ADLs; assess patient's baseline for ADL function and identify physical deficits which impact ability to perform ADLs (bathing, care of mouth/teeth, toileting, grooming, dressing, etc )  - Assess/evaluate cause of self-care deficits   - Assess range of motion  - Assess patient's mobility; develop plan if impaired  - Assess patient's need for assistive devices and provide as appropriate  - Encourage maximum independence but intervene and supervise when necessary  - Involve family in performance of ADLs  - Assess for home care needs following discharge   - Consider OT consult to assist with ADL evaluation and planning for discharge  - Provide patient education as appropriate  Outcome: Progressing  Goal: Maintain or return mobility status to optimal level  Description  INTERVENTIONS:  - Assess patient's baseline mobility status (ambulation, transfers, stairs, etc )    - Identify cognitive and physical deficits and behaviors that affect mobility  - Identify mobility aids required to assist with transfers and/or ambulation (gait belt, sit-to-stand, lift, walker, cane, etc )  - [place] fall precautions as indicated by assessment  - Record patient progress and toleration of activity level on Mobility SBAR; progress patient to next Phase/Stage  - Instruct patient to call for assistance with activity based on assessment  - Consider rehabilitation consult to assist with strengthening/weightbearing, etc   Outcome: Progressing     Problem: Knowledge Deficit  Goal: Patient/family/caregiver demonstrates understanding of disease process, treatment plan, medications, and discharge instructions  Description  Complete learning assessment and assess knowledge base    Interventions:  - Provide teaching at level of understanding  - Provide teaching via preferred learning methods  Outcome: Progressing     Problem: DISCHARGE PLANNING  Goal: Discharge to home or other facility with appropriate resources  Description  INTERVENTIONS:  - Identify barriers to discharge w/patient and caregiver  - Arrange for needed discharge resources and transportation as appropriate  - Identify discharge learning needs (meds, wound care, etc )  - Arrange for interpretive services to assist at discharge as needed  - Refer to Case Management Department for coordinating discharge planning if the patient needs post-hospital services based on physician/advanced practitioner order or complex needs related to functional status, cognitive ability, or social support system  Outcome: Progressing     Problem: POSTPARTUM  Goal: Experiences normal postpartum course  Description  INTERVENTIONS:  - Monitor maternal vital signs  - Assess uterine involution and lochia  Outcome: Progressing  Goal: Appropriate maternal -  bonding  Description  INTERVENTIONS:  - Identify family support  - Assess for appropriate maternal/infant bonding   -Encourage maternal/infant bonding opportunities  - Referral to  or  as needed  Outcome: Progressing  Goal: Establishment of infant feeding pattern  Description  INTERVENTIONS:  - Assess breast/bottle feeding  - Refer to lactation as needed  Outcome: Progressing  Goal: Incision(s), wounds(s) or drain site(s) healing without S/S of infection  Description  INTERVENTIONS  - Assess and document risk factors for skin impairment   - Assess and document dressing, incision, wound bed, drain sites and surrounding tissue  - Consider nutrition services referral as needed  - Oral mucous membranes remain intact  - Provide patient/ family education  Outcome: Progressing

## 2019-08-18 NOTE — PROGRESS NOTES
Progress Note - OB/GYN   [facility] 45 y o  female MRN: [id number]  Unit/Bed#: L&D 314-01 Encounter: [id number]    Assessment:  POD#2 s/p Repeat low transverse  section and Tubal Ligation, stable    Plan:  Routine postop care  Encourage ambulation and breastfeeding  Pain control as needed    Anticipate discharge tomorrow    Subjective/Objective   Chief Complaint:     POD#2 s/p Repeat low transverse  section and Tubal Ligation    Subjective:     Pain: incisional discomfort, rated 8/10, taking percocet  Tolerating PO: yes  Voiding: yes  Flatus: yes  BM: no  Ambulating: yes  Breastfeeding: Breastfeeding  Chest pain: no  Shortness of breath: no  Leg pain: no  Lochia: minimal     Objective:     Vitals:  Vitals:    19 1500 19 1906 19 2319 19 0515   BP: 119/81 112/67 114/73    BP Location: Right arm Right arm Right arm    Pulse: 93 89 100    Resp: 18 18 18    Temp: 98 3 °F (36 8 °C) 98 4 °F (36 9 °C) 99 °F (37 2 °C) 98 4 °F (36 9 °C)   TempSrc: Oral Oral Oral Oral   SpO2:       Weight:       Height:           Physical Exam:     Physical Exam   Constitutional: She is oriented to person, place, and time  She appears well-developed and well-nourished  No distress  Cardiovascular: Normal rate, regular rhythm, normal heart sounds and intact distal pulses  Pulmonary/Chest: Effort normal and breath sounds normal  No stridor  No respiratory distress  Abdominal: Soft  Bowel sounds are normal  She exhibits no distension  There is no tenderness  Neurological: She is alert and oriented to person, place, and time  Skin: Skin is warm and dry  She is not diaphoretic  Psychiatric: She has a normal mood and affect  Her behavior is normal    Uterine fundus firm and non-tender, at the umbilicus       Lab, Imaging and other studies: I have personally reviewed pertinent reports        Lab Results   Component Value Date    WBC 7 79 2019    HGB 11 4 (L) 2019    HCT 35 7 2019 MCV 93 08/17/2019     08/17/2019               Aditya Adkins DO  08/18/19

## 2019-08-19 VITALS
OXYGEN SATURATION: 97 % | WEIGHT: 194 LBS | HEART RATE: 70 BPM | TEMPERATURE: 97.3 F | DIASTOLIC BLOOD PRESSURE: 70 MMHG | RESPIRATION RATE: 16 BRPM | SYSTOLIC BLOOD PRESSURE: 107 MMHG | BODY MASS INDEX: 33.12 KG/M2 | HEIGHT: 64 IN

## 2019-08-19 DIAGNOSIS — G89.18 POSTOPERATIVE PAIN: Primary | ICD-10-CM

## 2019-08-19 LAB — RPR SER QL: NORMAL

## 2019-08-19 PROCEDURE — 99024 POSTOP FOLLOW-UP VISIT: CPT | Performed by: OBSTETRICS & GYNECOLOGY

## 2019-08-19 RX ORDER — DOCUSATE SODIUM 100 MG/1
100 CAPSULE, LIQUID FILLED ORAL 2 TIMES DAILY
Qty: 30 CAPSULE | Refills: 1
Start: 2019-08-19

## 2019-08-19 RX ORDER — OXYCODONE HYDROCHLORIDE AND ACETAMINOPHEN 5; 325 MG/1; MG/1
1 TABLET ORAL EVERY 4 HOURS PRN
Qty: 10 TABLET | Refills: 0
Start: 2019-08-19 | End: 2019-08-29

## 2019-08-19 RX ORDER — OXYCODONE HYDROCHLORIDE AND ACETAMINOPHEN 5; 325 MG/1; MG/1
1 TABLET ORAL EVERY 6 HOURS PRN
Qty: 12 TABLET | Refills: 0 | Status: SHIPPED | OUTPATIENT
Start: 2019-08-19

## 2019-08-19 RX ORDER — IBUPROFEN 600 MG/1
600 TABLET ORAL EVERY 6 HOURS PRN
Qty: 30 TABLET | Refills: 0
Start: 2019-08-19

## 2019-08-19 RX ORDER — ACETAMINOPHEN 325 MG/1
650 TABLET ORAL EVERY 4 HOURS PRN
Qty: 30 TABLET | Refills: 0
Start: 2019-08-19

## 2019-08-19 RX ADMIN — IBUPROFEN 600 MG: 600 TABLET ORAL at 04:12

## 2019-08-19 RX ADMIN — DOCUSATE SODIUM 100 MG: 100 CAPSULE, LIQUID FILLED ORAL at 08:19

## 2019-08-19 RX ADMIN — OXYCODONE HYDROCHLORIDE AND ACETAMINOPHEN 2 TABLET: 5; 325 TABLET ORAL at 08:20

## 2019-08-19 NOTE — LACTATION NOTE
This note was copied from a baby's chart  Met with mother to go over discharge breastfeeding booklet including the  feeding log since birth for the first week  Emphasized 8 or more (12) feedings in a 24 hour period, what to expect for the number of diapers per day of life and the progression of properties of the  stooling pattern  Discussed s/s that breastfeeding is going well after day 4 and when to get help from a pediatrician or lactation support person after day 4  Booklet included Breast Pumping Instructions, When You Go Back to Work or School, and Breastfeeding Resources for after discharge including access to the number for the [employer]  Mom has been both breastfeeding and formula feeding  She verb she really wants to breastfeed, but the baby is crying at the breast and she feels like he is not getting anything  I explained ways to know he is getting enough and enc her to exclusively breastfeed for the first 3-4 weeks  I explained risks of early supplementation and enc paced bottle feeding if she continues to give a bottle  I enc her to call me for next feeding to assist her with feeding

## 2019-08-19 NOTE — PROGRESS NOTES
Progress Note - OB/GYN   Deysi Barrera 45 y o  female MRN: [id number]  Unit/Bed#: L&D 314-01 Encounter: [id number]    Assessment:  POD#3 s/p Repeat low transverse  section and Tubal Ligation, stable    Plan:  Routine postop care  Encourage ambulation and breastfeeding  Pain control as needed  Voiding freely    Anticipate discharge today    Subjective/Objective   Chief Complaint:     POD#3 s/p Repeat low transverse  section and Tubal Ligation    Subjective:     Pain: some incisional and cramping, taking percocet and motrin  Tolerating PO: yes  Voiding: yes  Flatus: yes  BM: no  Ambulating: yes  Breastfeeding: Breastfeeding  Chest pain: no  Shortness of breath: no  Leg pain: no  Lochia: minimal     Objective:     Vitals:  Vitals:    19 0515 19 0738 19 1514 19 2314   BP:  108/74 111/73 128/73   BP Location:  Right arm Right arm Left arm   Pulse:  84 87 73   Resp:     Temp: 98 4 °F (36 9 °C) 97 5 °F (36 4 °C) 97 5 °F (36 4 °C) 98 1 °F (36 7 °C)   TempSrc: Oral Oral Oral Oral   SpO2:       Weight:       Height:           Physical Exam:     Physical Exam   Constitutional: She is oriented to person, place, and time  She appears well-developed and well-nourished  No distress  HENT:   Head: Normocephalic and atraumatic  Cardiovascular: Normal rate and intact distal pulses  Pulmonary/Chest: Effort normal  No respiratory distress  Abdominal: Soft  Bowel sounds are normal  She exhibits no distension  There is no tenderness  Neurological: She is alert and oriented to person, place, and time  Skin: Skin is warm and dry  She is not diaphoretic  Psychiatric: She has a normal mood and affect   Her behavior is normal    Uterine fundus firm and non-tender, at the umbilicus   Incision c/d/i, no erythema or drainage        Lab Results   Component Value Date    WBC 7 79 2019    HGB 11 4 (L) 2019    HCT 35 7 2019    MCV 93 2019     2019 Jorgito Rosado MD  08/19/19

## 2019-08-19 NOTE — PLAN OF CARE
Problem: BIRTH - VAGINAL/ SECTION  Goal: Fetal and maternal status remain reassuring during the birth process  Description  INTERVENTIONS:  - Monitor vital signs  - Monitor fetal heart rate  - Monitor uterine activity  - Monitor labor progression (vaginal delivery)  - DVT prophylaxis  - Antibiotic prophylaxis  Outcome: Completed  Goal: Emotionally satisfying birthing experience for mother/fetus  Description  Interventions:  - Assess, plan, implement and evaluate the nursing care given to the patient in labor  - Advocate the philosophy that each childbirth experience is a unique experience and support the family's chosen level of involvement and control during the labor process   - Actively participate in both the patient's and family's teaching of the birth process  - Consider cultural, Spiritism and age-specific factors and plan care for the patient in labor  Outcome: Completed     Problem: PAIN - ADULT  Goal: Verbalizes/displays adequate comfort level or baseline comfort level  Description  Interventions:  - Encourage patient to monitor pain and request assistance  - Assess pain using appropriate pain scale  - Administer analgesics based on type and severity of pain and evaluate response  - Implement non-pharmacological measures as appropriate and evaluate response  - Consider cultural and social influences on pain and pain management  - Notify physician/advanced practitioner if interventions unsuccessful or patient reports new pain  Outcome: Completed     Problem: INFECTION - ADULT  Goal: Absence or prevention of progression during hospitalization  Description  INTERVENTIONS:  - Assess and monitor for signs and symptoms of infection  - Monitor lab/diagnostic results  - Monitor all insertion sites, i e  indwelling lines, tubes, and drains  - Monitor endotracheal if appropriate and nasal secretions for changes in amount and color  - [place] appropriate cooling/warming therapies per order  - Administer medications as ordered  - Instruct and encourage patient and family to use good hand hygiene technique  - Identify and instruct in appropriate isolation precautions for identified infection/condition  Outcome: Completed     Problem: SAFETY ADULT  Goal: Patient will remain free of falls  Description  INTERVENTIONS:  - Assess patient frequently for physical needs  -  Identify cognitive and physical deficits and behaviors that affect risk of falls    -  [place] fall precautions as indicated by assessment   - Educate patient/family on patient safety including physical limitations  - Instruct patient to call for assistance with activity based on assessment  - Modify environment to reduce risk of injury  - Consider OT/PT consult to assist with strengthening/mobility  Outcome: Completed  Goal: Maintain or return to baseline ADL function  Description  INTERVENTIONS:  -  Assess patient's ability to carry out ADLs; assess patient's baseline for ADL function and identify physical deficits which impact ability to perform ADLs (bathing, care of mouth/teeth, toileting, grooming, dressing, etc )  - Assess/evaluate cause of self-care deficits   - Assess range of motion  - Assess patient's mobility; develop plan if impaired  - Assess patient's need for assistive devices and provide as appropriate  - Encourage maximum independence but intervene and supervise when necessary  - Involve family in performance of ADLs  - Assess for home care needs following discharge   - Consider OT consult to assist with ADL evaluation and planning for discharge  - Provide patient education as appropriate  Outcome: Completed  Goal: Maintain or return mobility status to optimal level  Description  INTERVENTIONS:  - Assess patient's baseline mobility status (ambulation, transfers, stairs, etc )    - Identify cognitive and physical deficits and behaviors that affect mobility  - Identify mobility aids required to assist with transfers and/or ambulation (gait belt, sit-to-stand, lift, walker, cane, etc )  - [employer] fall precautions as indicated by assessment  - Record patient progress and toleration of activity level on Mobility SBAR; progress patient to next Phase/Stage  - Instruct patient to call for assistance with activity based on assessment  - Consider rehabilitation consult to assist with strengthening/weightbearing, etc   Outcome: Completed     Problem: Knowledge Deficit  Goal: Patient/family/caregiver demonstrates understanding of disease process, treatment plan, medications, and discharge instructions  Description  Complete learning assessment and assess knowledge base    Interventions:  - Provide teaching at level of understanding  - Provide teaching via preferred learning methods  Outcome: Completed     Problem: DISCHARGE PLANNING  Goal: Discharge to home or other facility with appropriate resources  Description  INTERVENTIONS:  - Identify barriers to discharge w/patient and caregiver  - Arrange for needed discharge resources and transportation as appropriate  - Identify discharge learning needs (meds, wound care, etc )  - Arrange for interpretive services to assist at discharge as needed  - Refer to Case Management Department for coordinating discharge planning if the patient needs post-hospital services based on physician/advanced practitioner order or complex needs related to functional status, cognitive ability, or social support system  Outcome: Completed     Problem: POSTPARTUM  Goal: Experiences normal postpartum course  Description  INTERVENTIONS:  - Monitor maternal vital signs  - Assess uterine involution and lochia  Outcome: Completed  Goal: Appropriate maternal -  bonding  Description  INTERVENTIONS:  - Identify family support  - Assess for appropriate maternal/infant bonding   -Encourage maternal/infant bonding opportunities  - Referral to  or  as needed  Outcome: Completed  Goal: Establishment of infant feeding pattern  Description  INTERVENTIONS:  - Assess breast/bottle feeding  - Refer to lactation as needed  Outcome: Completed  Goal: Incision(s), wounds(s) or drain site(s) healing without S/S of infection  Description  INTERVENTIONS  - Assess and document risk factors for skin impairment   - Assess and document dressing, incision, wound bed, drain sites and surrounding tissue  - Consider nutrition services referral as needed  - Oral mucous membranes remain intact  - Provide patient/ family education  Outcome: Completed

## 2019-08-21 ENCOUNTER — TELEPHONE (OUTPATIENT)
Dept: OBGYN CLINIC | Facility: CLINIC | Age: 39
End: 2019-08-21

## 2019-08-21 NOTE — TELEPHONE ENCOUNTER
JAMAR for patient to [facility] to get her 6 wk post partum visit set up She delivered on 08/16/2019

## 2019-08-24 LAB — PLACENTA IN STORAGE: NORMAL

## 2019-09-11 ENCOUNTER — OFFICE VISIT (OUTPATIENT)
Dept: OBGYN CLINIC | Facility: CLINIC | Age: 39
End: 2019-09-11
Payer: COMMERCIAL

## 2019-09-11 VITALS — BODY MASS INDEX: 28.91 KG/M2 | DIASTOLIC BLOOD PRESSURE: 72 MMHG | WEIGHT: 168.4 LBS | SYSTOLIC BLOOD PRESSURE: 114 MMHG

## 2019-09-11 DIAGNOSIS — Z98.891 HISTORY OF 3 CESAREAN SECTIONS: ICD-10-CM

## 2019-09-11 DIAGNOSIS — Z98.51 HISTORY OF BILATERAL TUBAL LIGATION: ICD-10-CM

## 2019-09-11 DIAGNOSIS — Z98.890 POSTOPERATIVE STATE: Primary | ICD-10-CM

## 2019-09-11 PROCEDURE — 99213 OFFICE O/P EST LOW 20 MIN: CPT | Performed by: OBSTETRICS & GYNECOLOGY

## 2019-09-11 NOTE — PROGRESS NOTES
Assessment/Plan:    Diagnoses and all orders for this visit:    Postoperative state    History of 3  sections    History of bilateral tubal ligation    Varicose vein of leg, postpartum        Subjective:  Here for postoperative exam     Patient ID: Darrel Harden is a 45 y o  female  HPI   77-year-old female status post elective repeat  section with bilateral tubal ligation on 2019  Patient is doing well she continues to breast-feed without difficulty  Incisions healing normally  There is no drainage or surrounding hematoma or ecchymosis  She does have complaints of pain in her left lower leg with multiple superficial varicose veins in his planning to have these treated in the fall or early spring  Denies any problem with bowel or bladder  Recommend she return within the next 3 weeks for internal exam and completion of her postpartum exam       Review of Systems   All other systems reviewed and are negative  Objective:  No acute distress  /72 (BP Location: Right arm, Patient Position: Sitting, Cuff Size: Standard)   Wt 76 4 kg (168 lb 6 4 oz)   LMP 2018 (Exact Date)   BMI 28 91 kg/m²      Physical Exam   Constitutional: She appears well-developed and well-nourished  HENT:   Head: Normocephalic and atraumatic  Eyes: Pupils are equal, round, and reactive to light  EOM are normal    Neck: Normal range of motion  Neck supple  Abdominal: Soft  Bowel sounds are normal    Pfannenstiel skin incision healing normally without drainage or ecchymosis   Genitourinary:   Genitourinary Comments: Pelvic exam deferred   Musculoskeletal: Normal range of motion  Neurological: She is alert  Skin: Skin is warm and dry  Psychiatric: She has a normal mood and affect  Her behavior is normal  Judgment and thought content normal    Vitals reviewed

## 2019-10-07 ENCOUNTER — POSTPARTUM VISIT (OUTPATIENT)
Dept: OBGYN CLINIC | Facility: CLINIC | Age: 39
End: 2019-10-07
Payer: COMMERCIAL

## 2019-10-07 VITALS — DIASTOLIC BLOOD PRESSURE: 74 MMHG | WEIGHT: 164.6 LBS | BODY MASS INDEX: 28.25 KG/M2 | SYSTOLIC BLOOD PRESSURE: 110 MMHG

## 2019-10-07 DIAGNOSIS — N89.8 VAGINAL IRRITATION: ICD-10-CM

## 2019-10-07 DIAGNOSIS — N76.0 BV (BACTERIAL VAGINOSIS): Primary | ICD-10-CM

## 2019-10-07 DIAGNOSIS — Z98.51 STATUS POST TUBAL LIGATION: ICD-10-CM

## 2019-10-07 DIAGNOSIS — B96.89 BV (BACTERIAL VAGINOSIS): Primary | ICD-10-CM

## 2019-10-07 PROCEDURE — 99024 POSTOP FOLLOW-UP VISIT: CPT | Performed by: OBSTETRICS & GYNECOLOGY

## 2019-10-07 PROCEDURE — 87210 SMEAR WET MOUNT SALINE/INK: CPT | Performed by: OBSTETRICS & GYNECOLOGY

## 2019-10-07 RX ORDER — METRONIDAZOLE 7.5 MG/G
1 GEL VAGINAL
Qty: 5 G | Refills: 0 | Status: SHIPPED | OUTPATIENT
Start: 2019-10-07 | End: 2019-10-12

## 2019-10-07 NOTE — PROGRESS NOTES
OB POSTPARTUM VISIT PROGRESS NOTE  Date of Encounter:  2019      Liliam Brewer is in for her postpartum visit  She is now 6 weeks postpartum  She delivered by repeat  section and tubal ligation was performed  She's generally doing well, denies current pain or bleeding issues, and has no significant depression issues  She is breast feeding with some supplementation  We discussed all appropriate contraceptive options and she chooses none  Objective  No Acute Distress  EXAM:  GENERAL: alert, well appearing, and in no distress  VITALS: Documented in the Nurses notes  BREASTS: Deferred    PELVIC:   VULVA: Nml EGBUS  VAGINA: Introitus well healed, slightly tender  CERVIX: Normal, no discharge  UTERUS: Anteverted, NSSC, Mobile, NT    RIGHT ADNEXUM: Nontender, no mass  LEFT ADNEXUM: Nontender, no mass  RECTAL: Deferred  Pfannenstiel skin incision well-healed without signs of breakdown or infection    Assessment/Plan   Normal postpartum visit and exam  Reviewed contraceptive planning  Follow-up for annual exam and p r n  Annual exam due in 2020  Bacterial vaginosis noted  Prescription for Metrogel given to patient    C   Venus Pollock DO

## 2019-10-11 DIAGNOSIS — Z98.891 HISTORY OF 2 CESAREAN SECTIONS: ICD-10-CM

## 2019-10-11 RX ORDER — PNV NO.95/FERROUS FUM/FOLIC AC 28MG-0.8MG
1 TABLET ORAL DAILY
Qty: 30 TABLET | Refills: 0 | Status: SHIPPED | OUTPATIENT
Start: 2019-10-11

## 2021-01-26 NOTE — PROGRESS NOTES
Triage Note - OB  Selena Jones 45 y o  female MRN: [id number]  Unit/Bed#: L&D 329-01 Encounter: [id number]    OB TRIAGE NOTE  Selena Jones  [id number]  7/17/2019  4:09 PM  L&D 329/L&D 329-01    ASSESS:  45 y o  [id number] with right sided abdominal pain, not in labor  PLAN  #1  Right sided abdominal pain:   · Small mass/nodule palpated on right abdomen at the level of the umbilicus, tender to touch, unable to assess on bedside ultrasound  · [place] showing irregular contractions, SVE FT/50/-3, unchanged from Monday (pt does not think she is in labor)  · Advised extra-strength tylenol + hot/cold compresses on affected area for pain control  · Patient instructed to call if experiencing worsening contractions, vaginal bleeding, loss of fluid or decreased fetal movement  · Will follow up with Dr Johana Oneal on 7/22, repeat LTCS scheduled for 8/16    D/w Dr Johana Oneal  ______________    SUBJECTIVE    JANA: Estimated Date of Delivery: 8/22/19    HPI Chronology:  45 y o  [id number] 34w6d presents with complaint of right sided pain that started on Sunday  Pain is worse with movement  This pain does not radiate  She can feel a small bump on her abdomen in the area that hurts  She thinks it might be the baby's position  She denies any trauma to her abdomen  She also feels occassional contractions  She denies LOF, bleeding  +fetal movement  Vitals:   /56   Pulse 80   Temp 98 7 °F (37 1 °C) (Oral)   Resp 16   Ht 5' 4" (1 626 m)   Wt 84 4 kg (186 lb)   LMP 11/18/2018 (Exact Date)   SpO2 100%   BMI 31 93 kg/m²   Body mass index is 31 93 kg/m²  Physical Exam   Constitutional: She is oriented to person, place, and time  She appears well-developed and well-nourished  Cardiovascular: Normal rate  Pulmonary/Chest: Effort normal  No respiratory distress     Abdominal:   Gravid, soft; palpable mass on right side at the level of the umbilicus, tender to touch; mobile   Neurological: She is alert and oriented to [FreeTextEntry1] : Physical Exam\par Constitutional: alert and in no acute distress. \par \par Vascular: the pedal pulses are present . there was no peripheral edema. \par \par Musculoskeletal: normal gait, no clubbing or cyanosis of the fingernails, no joint swelling seen and muscle strength and tone were normal. \par \par Skin: normal skin color and pigmentation, normal skin turgor and no rash. \par \par Neurological: deep tendon reflexes were 2+ and symmetric, the sensory exam was normal to light touch and pinprick and no focal deficits. \par \par Psychiatric: oriented to person, place, and time, insight and judgment were intact and the affect was normal. \par  \par Procedure\par Physical Exam\par Constitutional: alert, in no acute distress, well nourished, well developed, healthy appearing and normal voice and communication. \par Vascular: the pedal pulses are present . there was no peripheral edema. DP/PT 2/4 b/l; CFT<3 sec x10; pedal hair present; no edema no varicosities. \par Musculoskeletal: no clubbing or cyanosis of the fingernails, no joint swelling seen and muscle strength and tone were normal . limb length discrepancy (right<left); pain on palpation of right midfoot and with midtarsal joint range of motion. \par Skin: normal skin color and pigmentation, normal skin turgor and no rash . Mass noted on the dorsum aspect of the foot. No signs of acute infection. \par Foot Exam: The right foot was examined, The left foot was examined. The right foot was tender and dorsum tarsal metatarsal joint, but normal in appearance, not swollen and not erythematous. The right toes were normal. Capillary refills for the right foot were 2+ in the posterior tibialis and 2+ in the dorsalis pedis. The sensory exam of the right foot showed normal vibratory sensation at the level of the toes and normal position sense at the level of the toes. The left foot was not normal in appearance, tender and dorsal tarsal metatarsal joint, but not swollen and not erythematous. The left toes were normal. Capillary refills for the left foot were 2+ in the posterior tibialis and 2+ in the dorsalis pedis. The sensory exam of the left foot showed normal vibratory sensation at the level of the toes and normal position sense at the level of the toes. \par Monofilament Testing: normal tactile sensation with monofilament testing throughout right foot, normal tactile sensation with monofilament testing throughout left foot. \par Neurological: deep tendon reflexes were 2+ and symmetric, the sensory exam was normal to light touch and pinprick and no focal deficits. \par Psychiatric: oriented to person, place, and time, insight and judgment were intact and the affect was normal. \par  \par \par Patient examined, history and chart reviewed\par discussed complications and benefits of injection in detail patient consented to injection right foot\par injection of right foot with 0.5 cc betamethasone and 1cc of lidocaine 2%, patient demonstrated verbal \par Follow up in 4 weeks. \par  person, place, and time  SVE: FT/50/-3    FHT:  Baseline Rate: 135 bpm  Variability: Moderate 6-25 bpm  Accelerations: 15 x 15 or greater  Decelerations: None  FHR Category: Category I  TOCO:   Contraction Frequency (minutes): 2-6  Contraction Duration (seconds): 60-80  Contraction Quality: Mild    Labs:   Recent Results (from the past 24 hour(s))   UA w Reflex to Microscopic w Reflex to Culture    Collection Time: 07/17/19  1:50 PM   Result Value Ref Range    Color, UA Yellow     Clarity, UA Clear     Specific Gravity, UA 1 020 1 003 - 1 030    pH, UA 7 0 4 5, 5 0, 5 5, 6 0, 6 5, 7 0, 7 5, 8 0    Leukocytes, UA Negative Negative    Nitrite, UA Negative Negative    Protein, UA Negative Negative mg/dl    Glucose, UA Negative Negative mg/dl    Ketones, UA Negative Negative mg/dl    Urobilinogen, UA 0 2 0 2, 1 0 E U /dl E U /dl    Bilirubin, UA Negative Negative    Blood, UA Negative Negative    URINE COMMENT         Lab, Imaging and other studies: I have personally reviewed pertinent reports          Kate Saleem MD  OB/GYN PGY-2  7/17/2019  4:09 PM

## 2024-02-22 NOTE — PROGRESS NOTES
Thank you for allowing me to participate in the care of your patient  Thomas Grgeory was seen today for  fetal growth secondary to marginal placental cord insertion  Normal interval fetal growth and fluid are seen  Follow up recommended:  No further follow-up ultrasounds are recommended at this time unless other indications arise      Ailin Wright MD none

## 2024-03-27 ENCOUNTER — HOSPITAL ENCOUNTER (EMERGENCY)
Facility: HOSPITAL | Age: 44
Discharge: HOME | End: 2024-03-27
Attending: EMERGENCY MEDICINE

## 2024-03-27 ENCOUNTER — APPOINTMENT (EMERGENCY)
Dept: RADIOLOGY | Facility: HOSPITAL | Age: 44
End: 2024-03-27

## 2024-03-27 VITALS
RESPIRATION RATE: 20 BRPM | HEART RATE: 77 BPM | OXYGEN SATURATION: 99 % | SYSTOLIC BLOOD PRESSURE: 113 MMHG | DIASTOLIC BLOOD PRESSURE: 74 MMHG | TEMPERATURE: 97.7 F

## 2024-03-27 DIAGNOSIS — S93.409A ANKLE SPRAIN: Primary | ICD-10-CM

## 2024-03-27 PROCEDURE — 73610 X-RAY EXAM OF ANKLE: CPT

## 2024-03-27 PROCEDURE — 99283 EMERGENCY DEPT VISIT LOW MDM: CPT

## 2024-03-27 PROCEDURE — 99284 EMERGENCY DEPT VISIT MOD MDM: CPT | Performed by: EMERGENCY MEDICINE

## 2024-03-27 RX ORDER — IBUPROFEN 800 MG/1
800 TABLET ORAL 3 TIMES DAILY
Qty: 21 TABLET | Refills: 0 | Status: SHIPPED | OUTPATIENT
Start: 2024-03-27

## 2024-03-27 RX ORDER — IBUPROFEN 400 MG/1
800 TABLET ORAL ONCE
Status: COMPLETED | OUTPATIENT
Start: 2024-03-27 | End: 2024-03-27

## 2024-03-27 RX ADMIN — IBUPROFEN 800 MG: 400 TABLET, FILM COATED ORAL at 18:26

## 2024-03-27 NOTE — ED PROVIDER NOTES
History  Chief Complaint   Patient presents with    Ankle Injury     Pt mis-stepped descending stairs rolling right ankle     Patient said she was going down the steps and she twisted her right ankle prior to arrival denies any weakness no extremity no other injuries not take anything for the pain came straight to the ER      History provided by:  Patient   used: No    Ankle Injury  Associated symptoms: no abdominal pain, no chest pain, no fever, no rash and no shortness of breath        Prior to Admission Medications   Prescriptions Last Dose Informant Patient Reported? Taking?   Elastic Bandages & Supports (MEDICAL COMPRESSION THIGH HIGH) MISC  Self No No   Sig: by Does not apply route daily 31-40 mmHg   Patient not taking: Reported on 7/15/2019   Prenatal Vit-Fe Fumarate-FA (PRENATAL VITAMIN) 28-0.8 mg   No No   Sig: TAKE 1 TABLET BY MOUTH DAILY   acetaminophen (TYLENOL) 325 mg tablet   No No   Sig: Take 2 tablets (650 mg total) by mouth every 4 (four) hours as needed for mild pain   Patient not taking: Reported on 10/7/2019   docusate sodium (COLACE) 100 mg capsule   No No   Sig: Take 1 capsule (100 mg total) by mouth 2 (two) times a day   Patient not taking: Reported on 9/11/2019   famotidine (PEPCID) 20 mg tablet  Self No No   Sig: Take 1 tablet (20 mg total) by mouth 2 (two) times a day   Patient not taking: Reported on 9/11/2019   ibuprofen (MOTRIN) 600 mg tablet   No No   Sig: Take 1 tablet (600 mg total) by mouth every 6 (six) hours as needed for moderate pain   Patient not taking: Reported on 10/7/2019   oxyCODONE-acetaminophen (PERCOCET) 5-325 mg per tablet   No No   Sig: Take 1 tablet by mouth every 6 (six) hours as needed for moderate painMax Daily Amount: 4 tablets   Patient not taking: Reported on 9/11/2019      Facility-Administered Medications: None       Past Medical History:   Diagnosis Date    Miscarriage     2    Varicella     Had as a child       Past Surgical History:    Procedure Laterality Date     SECTION      COSMETIC SURGERY      IN  DELIVERY ONLY N/A 2019    Procedure:  SECTION () REPEAT;  Surgeon: C William Riedel, DO;  Location: [facility] ;  Service: Obstetrics    TUBAL LIGATION N/A 2019    Procedure: LIGATION/COAGULATION TUBAL;  Surgeon: C William Riedel, DO;  Location: [facility] ;  Service: Obstetrics       Family History   Problem Relation Age of Onset    Hypertension Mother     Hypertension Father     Diabetes Father     Diabetes Maternal Grandmother     Diabetes Maternal Grandfather     Prostate cancer Maternal Grandfather     Diabetes Paternal Grandmother     Diabetes Paternal Grandfather      I have reviewed and agree with the history as documented.    E-Cigarette/Vaping     E-Cigarette/Vaping Substances     Social History     Tobacco Use    Smoking status: Never    Smokeless tobacco: Never   Substance Use Topics    Alcohol use: No    Drug use: No       Review of Systems   Constitutional:  Negative for fever.   Respiratory:  Negative for shortness of breath.    Cardiovascular:  Negative for chest pain.   Gastrointestinal:  Negative for abdominal pain.   Musculoskeletal:  Negative for arthralgias and back pain.   Skin:  Negative for color change and rash.   Neurological:  Negative for seizures, syncope, weakness and numbness.   All other systems reviewed and are negative.      Physical Exam  Physical Exam  Vitals and nursing note reviewed.   Constitutional:       General: She is not in acute distress.     Appearance: She is well-developed.   HENT:      Head: Normocephalic and atraumatic.      Mouth/Throat:      Mouth: Mucous membranes are moist.   Eyes:      Extraocular Movements: Extraocular movements intact.      Conjunctiva/sclera: Conjunctivae normal.   Cardiovascular:      Rate and Rhythm: Normal rate.   Pulmonary:      Effort: Pulmonary effort is normal. No respiratory distress.   Abdominal:      General: There is no  distension.   Musculoskeletal:      Cervical back: Neck supple.      Comments: Patient has minimal swelling to the right lateral malleolus tenderness over that area there is no crepitus there is no laxity in the ankle fifth metatarsal is nontender she has no proximal fifth tibia pain  on palp.   Skin:     General: Skin is warm and dry.      Capillary Refill: Capillary refill takes less than 2 seconds.   Neurological:      General: No focal deficit present.      Mental Status: She is alert.      Sensory: No sensory deficit.   Psychiatric:         Mood and Affect: Mood normal.         Vital Signs  ED Triage Vitals [03/27/24 1819]   Temperature Pulse Respirations Blood Pressure SpO2   97.7 °F (36.5 °C) 77 20 113/74 99 %      Temp Source Heart Rate Source Patient Position - Orthostatic VS BP Location FiO2 (%)   Tympanic Monitor Sitting Right arm --      Pain Score       10 - Worst Possible Pain           Vitals:    03/27/24 1819   BP: 113/74   Pulse: 77   Patient Position - Orthostatic VS: Sitting         Visual Acuity      ED Medications  Medications   ibuprofen (MOTRIN) tablet 800 mg (800 mg Oral Given 3/27/24 1826)       Diagnostic Studies  Results Reviewed       None                   XR ankle 3+ views RIGHT    (Results Pending)              Procedures  Procedures         ED Course                                             Medical Decision Making  Trays done to rule out fracture dislocation was negative my read neurovascular intact patient put in a splint and crutches for comfort again was questioning chance of pregnancy to deny placed on anti-inflammatory  follow up with Ortho    Amount and/or Complexity of Data Reviewed  Radiology: ordered.    Risk  Prescription drug management.             Disposition  Final diagnoses:   Ankle sprain     Time reflects when diagnosis was documented in both MDM as applicable and the Disposition within this note       Time User Action Codes Description Comment    3/27/2024  6:46  PM Vickey Bush Add [S93.409A] Ankle sprain           ED Disposition       ED Disposition   Discharge    Condition   Stable    Date/Time   Wed Mar 27, 2024  6:46 PM    Comment   Edison LIMON BethaniekedarIvoryradhas discharge to home/self care.                   Follow-up Information       Follow up With Specialties Details Why Contact Info Additional Information    Bud Teresa MD Family Medicine In 3 days  [address]  [address]  [address]  [facility] [id number] [phone]       [facility] [place] [facility] In 3 days  [address]  [address]  [facility] [place] [address] [phone] [facility] [place], [facility] [address], [place], [place], [address] [phone]            Patient's Medications   Discharge Prescriptions    IBUPROFEN (MOTRIN) 800 MG TABLET    Take 1 tablet (800 mg total) by mouth 3 (three) times a day       Start Date: 3/27/2024 End Date: --       Order Dose: 800 mg       Quantity: 21 tablet    Refills: 0       No discharge procedures on file.    PDMP Review       None            ED Provider  Electronically Signed by             Vickey Bush MD  03/27/24 2949

## 2024-03-27 NOTE — Clinical Note
Edison Diamond was seen and treated in our emergency department on 3/27/2024.                Diagnosis:     Edison  .    She may return on this date: 03/29/2024         If you have any questions or concerns, please don't hesitate to call.      Vickey Bush MD    ______________________________           _______________          _______________  Hospital Representative                              Date                                Time

## 2024-09-16 NOTE — PROGRESS NOTES
Orders:    Lipid panel    Comprehensive metabolic panel     Assessment/Plan:    Diagnoses and all orders for this visit:    History of 2  sections  -     Prenatal Vit-Fe Fumarate-FA (PRENATAL VITAMIN) 28-0 8 mg; Take 1 tablet by mouth daily    Amenorrhea  -     POCT urine HCG    Encounter for incidental pregnancy  -     Obstetric panel; Future  -     TSH, 3rd generation with Free T4 reflex; Future  -     hCG, quantitative; Future  -     Progesterone; Future  -     US OB < 14 weeks single or first gestation level 1; Future    History of ovarian cyst    History of anemia      Subjective:  Here for pregnancy confirmation     Patient ID: Abhijeet Bethea is a 45 y o  female  HPI   29-year-old female  5 para  here for pregnancy confirmation visit  Patient has had 2 prior C-sections the 1st in  viable female infant  was indicated for failure to progress  2nd  in  viable male for elective repeat  Both children are healthy  She states she had 2 spontaneous miscarriages in the past   Currently she feels well FD LMP would be 2018, estimated gestational age 7 weeks and 3 days, [facility] 2019  Patient understands a repeat  would be ordered for this pregnancy  She also understands at the age of 45 she is advanced maternal age and has its associated risks which were briefly discussed  Screening laboratory studies were ordered as well as a dating pelvic ultrasound  Patient will return within the next 2 weeks for nurse interview and review lab test studies  Patient will return within the next 3-4 weeks for physical exam   All questions answered patient oriented to office practice and procedures, all questions answered  Review of Systems   All other systems reviewed and are negative  Objective:  /60 height 5 ft 4 weight 149 lb, no acute distress     Physical Exam   Constitutional: She is oriented to person, place, and time  She appears well-developed and well-nourished  HENT:   Head: Normocephalic  Eyes: Pupils are equal, round, and reactive to light  Neck: Normal range of motion  Genitourinary:   Genitourinary Comments: Pelvic exam deferred   Musculoskeletal: Normal range of motion  Neurological: She is alert and oriented to person, place, and time  Skin: Skin is warm and dry  Psychiatric: She has a normal mood and affect

## (undated) DEVICE — SUT VICRYL 3-0 CT-1 36 IN J944H

## (undated) DEVICE — SUT MONOCRYL 4-0 PS-2 27 IN Y426H

## (undated) DEVICE — SCD SEQUENTIAL COMPRESSION COMFORT SLEEVE MEDIUM KNEE LENGTH: Brand: KENDALL SCD

## (undated) DEVICE — ABG MICROSTICKS SAFETY

## (undated) DEVICE — SUT VICRYL 0 CTX 36 IN J978H

## (undated) DEVICE — ADHESIVE SKN CLSR HISTOACRYL FLEX 0.5ML LF

## (undated) DEVICE — GLOVE SRG BIOGEL ECLIPSE 7.5

## (undated) DEVICE — SUT VICRYL 0 CT-1 36 IN J946H

## (undated) DEVICE — PACK C-SECTION PBDS